# Patient Record
Sex: MALE | Race: WHITE | NOT HISPANIC OR LATINO | Employment: OTHER | URBAN - METROPOLITAN AREA
[De-identification: names, ages, dates, MRNs, and addresses within clinical notes are randomized per-mention and may not be internally consistent; named-entity substitution may affect disease eponyms.]

---

## 2023-12-13 ENCOUNTER — APPOINTMENT (EMERGENCY)
Dept: CT IMAGING | Facility: HOSPITAL | Age: 58
End: 2023-12-13
Payer: COMMERCIAL

## 2023-12-13 ENCOUNTER — HOSPITAL ENCOUNTER (EMERGENCY)
Facility: HOSPITAL | Age: 58
Discharge: HOME | End: 2023-12-13
Attending: EMERGENCY MEDICINE
Payer: COMMERCIAL

## 2023-12-13 VITALS
RESPIRATION RATE: 16 BRPM | OXYGEN SATURATION: 99 % | TEMPERATURE: 97.8 F | SYSTOLIC BLOOD PRESSURE: 124 MMHG | DIASTOLIC BLOOD PRESSURE: 85 MMHG | HEART RATE: 88 BPM

## 2023-12-13 DIAGNOSIS — M54.50 ACUTE RIGHT-SIDED LOW BACK PAIN WITHOUT SCIATICA: Primary | ICD-10-CM

## 2023-12-13 LAB
ALBUMIN SERPL BCP-MCNC: 4.7 G/DL (ref 3.5–5)
ALP SERPL-CCNC: 60 U/L (ref 34–104)
ALT SERPL W P-5'-P-CCNC: 24 U/L (ref 7–52)
ANION GAP SERPL CALCULATED.3IONS-SCNC: 8 MMOL/L
AST SERPL W P-5'-P-CCNC: 20 U/L (ref 13–39)
BACTERIA UR QL AUTO: NORMAL /HPF
BASOPHILS # BLD AUTO: 0.03 THOUSANDS/ÂΜL (ref 0–0.1)
BASOPHILS NFR BLD AUTO: 0 % (ref 0–1)
BILIRUB SERPL-MCNC: 0.44 MG/DL (ref 0.2–1)
BILIRUB UR QL STRIP: NEGATIVE
BUN SERPL-MCNC: 32 MG/DL (ref 5–25)
CALCIUM SERPL-MCNC: 9.6 MG/DL (ref 8.4–10.2)
CHLORIDE SERPL-SCNC: 106 MMOL/L (ref 96–108)
CLARITY UR: CLEAR
CO2 SERPL-SCNC: 25 MMOL/L (ref 21–32)
COLOR UR: YELLOW
CREAT SERPL-MCNC: 1.24 MG/DL (ref 0.6–1.3)
EOSINOPHIL # BLD AUTO: 0.04 THOUSAND/ÂΜL (ref 0–0.61)
EOSINOPHIL NFR BLD AUTO: 1 % (ref 0–6)
ERYTHROCYTE [DISTWIDTH] IN BLOOD BY AUTOMATED COUNT: 15 % (ref 11.6–15.1)
GFR SERPL CREATININE-BSD FRML MDRD: 62 ML/MIN/1.73SQ M
GLUCOSE SERPL-MCNC: 119 MG/DL (ref 65–140)
GLUCOSE UR STRIP-MCNC: NEGATIVE MG/DL
HCT VFR BLD AUTO: 49.6 % (ref 36.5–49.3)
HGB BLD-MCNC: 16.5 G/DL (ref 12–17)
HGB UR QL STRIP.AUTO: ABNORMAL
IMM GRANULOCYTES # BLD AUTO: 0.03 THOUSAND/UL (ref 0–0.2)
IMM GRANULOCYTES NFR BLD AUTO: 0 % (ref 0–2)
KETONES UR STRIP-MCNC: NEGATIVE MG/DL
LEUKOCYTE ESTERASE UR QL STRIP: NEGATIVE
LIPASE SERPL-CCNC: 56 U/L (ref 11–82)
LYMPHOCYTES # BLD AUTO: 0.86 THOUSANDS/ÂΜL (ref 0.6–4.47)
LYMPHOCYTES NFR BLD AUTO: 12 % (ref 14–44)
MCH RBC QN AUTO: 30.6 PG (ref 26.8–34.3)
MCHC RBC AUTO-ENTMCNC: 33.3 G/DL (ref 31.4–37.4)
MCV RBC AUTO: 92 FL (ref 82–98)
MONOCYTES # BLD AUTO: 0.77 THOUSAND/ÂΜL (ref 0.17–1.22)
MONOCYTES NFR BLD AUTO: 11 % (ref 4–12)
NEUTROPHILS # BLD AUTO: 5.62 THOUSANDS/ÂΜL (ref 1.85–7.62)
NEUTS SEG NFR BLD AUTO: 76 % (ref 43–75)
NITRITE UR QL STRIP: NEGATIVE
NON-SQ EPI CELLS URNS QL MICRO: NORMAL /HPF
NRBC BLD AUTO-RTO: 0 /100 WBCS
PH UR STRIP.AUTO: 6 [PH]
PLATELET # BLD AUTO: 161 THOUSANDS/UL (ref 149–390)
PMV BLD AUTO: 8.7 FL (ref 8.9–12.7)
POTASSIUM SERPL-SCNC: 4.8 MMOL/L (ref 3.5–5.3)
PROT SERPL-MCNC: 7.6 G/DL (ref 6.4–8.4)
PROT UR STRIP-MCNC: NEGATIVE MG/DL
RBC # BLD AUTO: 5.39 MILLION/UL (ref 3.88–5.62)
RBC #/AREA URNS AUTO: NORMAL /HPF
SODIUM SERPL-SCNC: 139 MMOL/L (ref 135–147)
SP GR UR STRIP.AUTO: >=1.03 (ref 1–1.03)
UROBILINOGEN UR QL STRIP.AUTO: 0.2 E.U./DL
WBC # BLD AUTO: 7.35 THOUSAND/UL (ref 4.31–10.16)
WBC #/AREA URNS AUTO: NORMAL /HPF

## 2023-12-13 PROCEDURE — 83690 ASSAY OF LIPASE: CPT | Performed by: EMERGENCY MEDICINE

## 2023-12-13 PROCEDURE — 96374 THER/PROPH/DIAG INJ IV PUSH: CPT

## 2023-12-13 PROCEDURE — 99284 EMERGENCY DEPT VISIT MOD MDM: CPT | Performed by: EMERGENCY MEDICINE

## 2023-12-13 PROCEDURE — 81001 URINALYSIS AUTO W/SCOPE: CPT | Performed by: EMERGENCY MEDICINE

## 2023-12-13 PROCEDURE — G1004 CDSM NDSC: HCPCS

## 2023-12-13 PROCEDURE — 81003 URINALYSIS AUTO W/O SCOPE: CPT | Performed by: EMERGENCY MEDICINE

## 2023-12-13 PROCEDURE — 99283 EMERGENCY DEPT VISIT LOW MDM: CPT

## 2023-12-13 PROCEDURE — 74177 CT ABD & PELVIS W/CONTRAST: CPT

## 2023-12-13 PROCEDURE — 85025 COMPLETE CBC W/AUTO DIFF WBC: CPT | Performed by: EMERGENCY MEDICINE

## 2023-12-13 PROCEDURE — 80053 COMPREHEN METABOLIC PANEL: CPT | Performed by: EMERGENCY MEDICINE

## 2023-12-13 PROCEDURE — 36415 COLL VENOUS BLD VENIPUNCTURE: CPT | Performed by: EMERGENCY MEDICINE

## 2023-12-13 RX ORDER — METHOCARBAMOL 500 MG/1
500 TABLET, FILM COATED ORAL 2 TIMES DAILY
Qty: 20 TABLET | Refills: 0 | Status: SHIPPED | OUTPATIENT
Start: 2023-12-13

## 2023-12-13 RX ORDER — IBUPROFEN 600 MG/1
600 TABLET ORAL EVERY 6 HOURS PRN
Qty: 30 TABLET | Refills: 0 | Status: SHIPPED | OUTPATIENT
Start: 2023-12-13

## 2023-12-13 RX ORDER — METHOCARBAMOL 500 MG/1
500 TABLET, FILM COATED ORAL ONCE
Status: COMPLETED | OUTPATIENT
Start: 2023-12-13 | End: 2023-12-13

## 2023-12-13 RX ORDER — KETOROLAC TROMETHAMINE 30 MG/ML
15 INJECTION, SOLUTION INTRAMUSCULAR; INTRAVENOUS ONCE
Status: COMPLETED | OUTPATIENT
Start: 2023-12-13 | End: 2023-12-13

## 2023-12-13 RX ADMIN — IOHEXOL 100 ML: 350 INJECTION, SOLUTION INTRAVENOUS at 19:17

## 2023-12-13 RX ADMIN — METHOCARBAMOL TABLETS 500 MG: 500 TABLET, COATED ORAL at 20:57

## 2023-12-13 RX ADMIN — KETOROLAC TROMETHAMINE 15 MG: 30 INJECTION INTRAMUSCULAR; INTRAVENOUS at 18:36

## 2023-12-13 NOTE — ED PROVIDER NOTES
"History  Chief Complaint   Patient presents with    Back Pain     R lower sharp back pain for a few days without ELIE. Took motrin PTA. [facility] and was sent for a CT scan for \"dilated intestines.\" Denies abdominal pain, N/V/D/C, fever, urinary changes.      58-year-old male presents to the emergency department for evaluation of back pain.  The patient reports that he believes he first felt the back pain a few days ago when he bent over to tie his shoe.  He denies any recent falls or direct trauma to the area.  He describes the pain as a constant dull achy sensation that is worse with movement.  He has been using over-the-counter medications including Motrin with minimal relief.  He went to urgent care prior to coming to the emergency department today.  He states that they did an x-ray and told him that his intestines were \"dilated\" and that he should come to the emergency department for a CT scan.  He denies fever, chills, nausea, vomiting, diarrhea, urinary symptoms.  He states his last bowel movement was today and was normal for him.        None       History reviewed. No pertinent past medical history.    History reviewed. No pertinent surgical history.    History reviewed. No pertinent family history.  I have reviewed and agree with the history as documented.    E-Cigarette/Vaping    E-Cigarette Use Never User      E-Cigarette/Vaping Substances     Social History     Tobacco Use    Smoking status: Never    Smokeless tobacco: Never   Vaping Use    Vaping status: Never Used   Substance Use Topics    Alcohol use: Not Currently    Drug use: Never       Review of Systems   Constitutional:  Negative for chills and fever.   HENT:  Negative for ear pain and sore throat.    Eyes:  Negative for pain and visual disturbance.   Respiratory:  Negative for cough and shortness of breath.    Cardiovascular:  Negative for chest pain and palpitations.   Gastrointestinal:  Negative for abdominal pain and " vomiting.   Genitourinary:  Negative for dysuria and hematuria.   Musculoskeletal:  Positive for back pain. Negative for arthralgias.   Skin:  Negative for color change and rash.   Neurological:  Negative for seizures and syncope.   All other systems reviewed and are negative.      Physical Exam  Physical Exam  Vitals and nursing note reviewed.   Constitutional:       General: He is not in acute distress.     Appearance: He is well-developed.   HENT:      Head: Normocephalic and atraumatic.      Right Ear: External ear normal.      Left Ear: External ear normal.      Mouth/Throat:      Mouth: Mucous membranes are moist.   Eyes:      Conjunctiva/sclera: Conjunctivae normal.   Cardiovascular:      Rate and Rhythm: Normal rate and regular rhythm.      Heart sounds: No murmur heard.  Pulmonary:      Effort: Pulmonary effort is normal. No respiratory distress.      Breath sounds: Normal breath sounds.   Abdominal:      Palpations: Abdomen is soft.      Tenderness: There is no abdominal tenderness.   Musculoskeletal:         General: No swelling.      Cervical back: Normal and neck supple.      Thoracic back: Normal.      Lumbar back: Normal. No tenderness or bony tenderness. Negative right straight leg raise test and negative left straight leg raise test.      Comments: No reproducible lumbar tenderness   Skin:     General: Skin is warm and dry.      Capillary Refill: Capillary refill takes less than 2 seconds.   Neurological:      Mental Status: He is alert.   Psychiatric:         Mood and Affect: Mood normal.         Vital Signs  ED Triage Vitals   Temperature Pulse Respirations Blood Pressure SpO2   12/13/23 1815 12/13/23 1815 12/13/23 1837 12/13/23 1815 12/13/23 1815   97.8 °F (36.6 °C) (!) 107 16 134/86 99 %      Temp Source Heart Rate Source Patient Position - Orthostatic VS BP Location FiO2 (%)   12/13/23 1815 12/13/23 1815 12/13/23 1815 12/13/23 1815 --   Oral Monitor Sitting Right arm       Pain Score        12/13/23 1836       6           Vitals:    12/13/23 1815 12/13/23 2059   BP: 134/86 124/85   Pulse: (!) 107 88   Patient Position - Orthostatic VS: Sitting Lying         Visual Acuity      ED Medications  Medications   ketorolac (TORADOL) injection 15 mg (15 mg Intravenous Given 12/13/23 1836)   iohexol (OMNIPAQUE) 350 MG/ML injection (SINGLE-DOSE) 100 mL (100 mL Intravenous Given 12/13/23 1917)   methocarbamol (ROBAXIN) tablet 500 mg (500 mg Oral Given 12/13/23 2057)       Diagnostic Studies  Results Reviewed       Procedure Component Value Units Date/Time    Urine Microscopic [[id number]]  (Normal) Collected: 12/13/23 1911    Lab Status: Final result Specimen: Urine, Clean Catch Updated: 12/13/23 1925     RBC, UA 1-2 /hpf      WBC, UA 1-2 /hpf      Epithelial Cells None Seen /hpf      Bacteria, UA None Seen /hpf     UA (URINE) with reflex to Scope [[id number]]  (Abnormal) Collected: 12/13/23 1911    Lab Status: Final result Specimen: Urine, Clean Catch Updated: 12/13/23 1919     Color, UA Yellow     Clarity, UA Clear     Specific Gravity, UA >=1.030     pH, UA 6.0     Leukocytes, UA Negative     Nitrite, UA Negative     Protein, UA Negative mg/dl      Glucose, UA Negative mg/dl      Ketones, UA Negative mg/dl      Urobilinogen, UA 0.2 E.U./dl      Bilirubin, UA Negative     Occult Blood, UA 1+    Comprehensive metabolic panel [[id number]]  (Abnormal) Collected: 12/13/23 1836    Lab Status: Final result Specimen: Blood from Arm, Left Updated: 12/13/23 1904     Sodium 139 mmol/L      Potassium 4.8 mmol/L      Chloride 106 mmol/L      CO2 25 mmol/L      ANION GAP 8 mmol/L      BUN 32 mg/dL      Creatinine 1.24 mg/dL      Glucose 119 mg/dL      Calcium 9.6 mg/dL      AST 20 U/L      ALT 24 U/L      Alkaline Phosphatase 60 U/L      Total Protein 7.6 g/dL      Albumin 4.7 g/dL      Total Bilirubin 0.44 mg/dL      eGFR 62 ml/min/1.73sq m     Narrative:      [employer] Kid[employer] guidelines for Chronic Kidney  Disease (CKD):     Stage 1 with normal or high GFR (GFR > 90 mL/min/1.73 square meters)    Stage 2 Mild CKD (GFR = 60-89 mL/min/1.73 square meters)    Stage 3A Moderate CKD (GFR = 45-59 mL/min/1.73 square meters)    Stage 3B Moderate CKD (GFR = 30-44 mL/min/1.73 square meters)    Stage 4 Severe CKD (GFR = 15-29 mL/min/1.73 square meters)    Stage 5 End Stage CKD (GFR <15 mL/min/1.73 square meters)  Note: GFR calculation is accurate only with a steady state creatinine    Lipase [[id number]]  (Normal) Collected: 12/13/23 1836    Lab Status: Final result Specimen: Blood from Arm, Left Updated: 12/13/23 1904     Lipase 56 u/L     CBC and differential [[id number]]  (Abnormal) Collected: 12/13/23 1836    Lab Status: Final result Specimen: Blood from Arm, Left Updated: 12/13/23 1846     WBC 7.35 Thousand/uL      RBC 5.39 Million/uL      Hemoglobin 16.5 g/dL      Hematocrit 49.6 %      MCV 92 fL      MCH 30.6 pg      MCHC 33.3 g/dL      RDW 15.0 %      MPV 8.7 fL      Platelets 161 Thousands/uL      nRBC 0 /100 WBCs      Neutrophils Relative 76 %      Immat GRANS % 0 %      Lymphocytes Relative 12 %      Monocytes Relative 11 %      Eosinophils Relative 1 %      Basophils Relative 0 %      Neutrophils Absolute 5.62 Thousands/µL      Immature Grans Absolute 0.03 Thousand/uL      Lymphocytes Absolute 0.86 Thousands/µL      Monocytes Absolute 0.77 Thousand/µL      Eosinophils Absolute 0.04 Thousand/µL      Basophils Absolute 0.03 Thousands/µL                    CT recon only lumbar spine   Final Result by Issa Vargas MD (12/13 2012)      1.  No fracture or traumatic subluxation.   2.  Mild overall degenerative changes as noted above.         Workstation performed: [id number]         CT abdomen pelvis with contrast   Final Result by Issa Vargas MD (12/13 2007)      1.  No acute findings in the abdomen/pelvis. No bowel obstruction.            Workstation performed: [id number]                    Procedures  Procedures    "      ED Course                   Medical Decision Making  58-year-old male presented to the emergency department for evaluation of back pain.  Prior to arrival the patient was evaluated at an urgent care and was sent to the emergency department for a CT scan because of \"dilated intestines\" that were seen on x-rays.  On arrival to the emergency department the patient was awake, alert, oriented and in no acute distress.  Initial vital signs within normal limits.  Physical exam was unremarkable.  Patient treated symptomatically with Toradol.  Based on abnormal outpatient imaging CT scan was ordered to evaluate for acute pathology including but not limited to obstruction, perforation, abscess, infection, fracture.  Blood work was grossly unremarkable.  Urinalysis was not consistent with a urinary tract infection.  CT scan with no acute findings.  All diagnostic studies were discussed with the patient in detail.  On reevaluation the patient reported some improvement of symptoms.  Patient given a dose of Robaxin.  He is appropriate for discharge at this time.  The patient was provided with a prescription for Motrin and Robaxin.  An ambulatory referral to [facility] was placed.  Recommendation was made for the patient to follow-up with his PCP and with [facility].  Return precautions were discussed.    Patient agrees with the plan for discharge and feels comfortable to go home with proper f/u. Advised to return for worsening or additional problems. Diagnostic tests were reviewed and questions answered. Diagnosis, care plan and treatment options were discussed. The patient understands instructions and will follow up as directed.        Amount and/or Complexity of Data Reviewed  Labs: ordered.  Radiology: ordered.    Risk  Prescription drug management.             Disposition  Final diagnoses:   Acute right-sided low back pain without sciatica     Time reflects when diagnosis was documented in both MDM as " applicable and the Disposition within this note       Time User Action Codes Description Comment    12/13/2023  8:53 PM Escobar Garcia Add [M54.50] Acute right-sided low back pain without sciatica           ED Disposition       ED Disposition   Discharge    Condition   Stable    Date/Time   Wed Dec 13, 2023  8:53 PM    Comment   Indio Copeland discharge to home/self care.                   Follow-up Information       Follow up With Specialties Details Why Contact Info Additional Information    [facility] Emergency Department Emergency Medicine Go to  If symptoms worsen [address] [address]  [phone] [facility] Emergency Department, [address] [address]    [facility] Physical Therapy Schedule an appointment as soon as possible for a visit   [phone]            Discharge Medication List as of 12/13/2023  8:55 PM        START taking these medications    Details   ibuprofen (MOTRIN) 600 mg tablet Take 1 tablet (600 mg total) by mouth every 6 (six) hours as needed for mild pain or moderate pain, Starting Wed 12/13/2023, Normal      methocarbamol (ROBAXIN) 500 mg tablet Take 1 tablet (500 mg total) by mouth 2 (two) times a day, Starting Wed 12/13/2023, Normal                 PDMP Review       None            ED Provider  Electronically Signed by             Escobar Garcia MD  12/13/23 6650     Rituxan Counseling:  I discussed with the patient the risks of Rituxan infusions. Side effects can include infusion reactions, severe drug rashes including mucocutaneous reactions, reactivation of latent hepatitis and other infections and rarely progressive multifocal leukoencephalopathy.  All of the patient's questions and concerns were addressed.

## 2023-12-13 NOTE — Clinical Note
Indio Copeland was seen and treated in our emergency department on 12/13/2023.    No restrictions            Diagnosis: Back pain    Indio  may return to work on return date.    He may return on this date: 12/15/2023         If you have any questions or concerns, please don't hesitate to call.      Escobar Garcia MD    ______________________________           _______________          _______________  Hospital Representative                              Date                                Time

## 2023-12-14 ENCOUNTER — TELEPHONE (OUTPATIENT)
Dept: PHYSICAL THERAPY | Facility: OTHER | Age: 58
End: 2023-12-14

## 2023-12-14 NOTE — TELEPHONE ENCOUNTER
Call placed to the patient per [facility] referral.    Phone call was answered but no one said "Hello". I asked for Earle Díaz and stated where I was calling from, and call was disconnected    This is the 1st attempt to reach the patient. Will defer per protocol.

## 2023-12-19 NOTE — TELEPHONE ENCOUNTER
Call placed to the patient per [facility] referral.    Voice message left for patient to call back. Phone number and hours of business provided.     This the 2nd attempt to reach the patient.   Will defer per protocol.

## 2024-03-08 ENCOUNTER — TELEPHONE (OUTPATIENT)
Age: 59
End: 2024-03-08

## 2025-05-15 ENCOUNTER — HOSPITAL ENCOUNTER (INPATIENT)
Facility: HOSPITAL | Age: 60
LOS: 2 days | Discharge: HOME | DRG: 394 | End: 2025-05-17
Attending: EMERGENCY MEDICINE | Admitting: STUDENT IN AN ORGANIZED HEALTH CARE EDUCATION/TRAINING PROGRAM
Payer: COMMERCIAL

## 2025-05-15 ENCOUNTER — APPOINTMENT (EMERGENCY)
Dept: CT IMAGING | Facility: HOSPITAL | Age: 60
DRG: 394 | End: 2025-05-15
Payer: COMMERCIAL

## 2025-05-15 DIAGNOSIS — R53.1 GENERALIZED WEAKNESS: ICD-10-CM

## 2025-05-15 DIAGNOSIS — R55 NEAR SYNCOPE: ICD-10-CM

## 2025-05-15 DIAGNOSIS — D64.9 ANEMIA: ICD-10-CM

## 2025-05-15 DIAGNOSIS — K92.1 MELENA: Primary | ICD-10-CM

## 2025-05-15 PROBLEM — R19.5 DARK STOOLS: Status: ACTIVE | Noted: 2025-05-15

## 2025-05-15 PROBLEM — R65.10 SIRS (SYSTEMIC INFLAMMATORY RESPONSE SYNDROME) (HCC): Status: ACTIVE | Noted: 2025-05-15

## 2025-05-15 LAB
ABO GROUP BLD: NORMAL
ABO GROUP BLD: NORMAL
ALBUMIN SERPL BCG-MCNC: 3.7 G/DL (ref 3.5–5)
ALP SERPL-CCNC: 31 U/L (ref 34–104)
ALT SERPL W P-5'-P-CCNC: 18 U/L (ref 7–52)
ANION GAP SERPL CALCULATED.3IONS-SCNC: 9 MMOL/L (ref 4–13)
APTT PPP: 21 SECONDS (ref 23–34)
AST SERPL W P-5'-P-CCNC: 17 U/L (ref 13–39)
BASOPHILS # BLD AUTO: 0.05 THOUSANDS/ÂΜL (ref 0–0.1)
BASOPHILS NFR BLD AUTO: 0 % (ref 0–1)
BILIRUB SERPL-MCNC: 0.31 MG/DL (ref 0.2–1)
BLD GP AB SCN SERPL QL: NEGATIVE
BUN SERPL-MCNC: 53 MG/DL (ref 5–25)
CALCIUM SERPL-MCNC: 8.4 MG/DL (ref 8.4–10.2)
CHLORIDE SERPL-SCNC: 106 MMOL/L (ref 96–108)
CO2 SERPL-SCNC: 22 MMOL/L (ref 21–32)
CREAT SERPL-MCNC: 1.06 MG/DL (ref 0.6–1.3)
EOSINOPHIL # BLD AUTO: 0 THOUSAND/ÂΜL (ref 0–0.61)
EOSINOPHIL NFR BLD AUTO: 0 % (ref 0–6)
ERYTHROCYTE [DISTWIDTH] IN BLOOD BY AUTOMATED COUNT: 19.4 % (ref 11.6–15.1)
GFR SERPL CREATININE-BSD FRML MDRD: 75 ML/MIN/1.73SQ M
GLUCOSE SERPL-MCNC: 207 MG/DL (ref 65–140)
HCT VFR BLD AUTO: 26.3 % (ref 36.5–49.3)
HEMOCCULT STL QL IA: POSITIVE
HGB BLD-MCNC: 8 G/DL (ref 12–17)
IMM GRANULOCYTES # BLD AUTO: 0.19 THOUSAND/UL (ref 0–0.2)
IMM GRANULOCYTES NFR BLD AUTO: 1 % (ref 0–2)
INR PPP: 1.11 (ref 0.85–1.19)
LIPASE SERPL-CCNC: 43 U/L (ref 11–82)
LYMPHOCYTES # BLD AUTO: 1.7 THOUSANDS/ÂΜL (ref 0.6–4.47)
LYMPHOCYTES NFR BLD AUTO: 12 % (ref 14–44)
MCH RBC QN AUTO: 25.9 PG (ref 26.8–34.3)
MCHC RBC AUTO-ENTMCNC: 30.4 G/DL (ref 31.4–37.4)
MCV RBC AUTO: 85 FL (ref 82–98)
MONOCYTES # BLD AUTO: 1.26 THOUSAND/ÂΜL (ref 0.17–1.22)
MONOCYTES NFR BLD AUTO: 9 % (ref 4–12)
NEUTROPHILS # BLD AUTO: 10.88 THOUSANDS/ÂΜL (ref 1.85–7.62)
NEUTS SEG NFR BLD AUTO: 78 % (ref 43–75)
NRBC BLD AUTO-RTO: 0 /100 WBCS
PLATELET # BLD AUTO: 204 THOUSANDS/UL (ref 149–390)
PMV BLD AUTO: 9.3 FL (ref 8.9–12.7)
POTASSIUM SERPL-SCNC: 3.9 MMOL/L (ref 3.5–5.3)
PROCALCITONIN SERPL-MCNC: 0.15 NG/ML
PROT SERPL-MCNC: 5.5 G/DL (ref 6.4–8.4)
PROTHROMBIN TIME: 14.7 SECONDS (ref 12.3–15)
RBC # BLD AUTO: 3.09 MILLION/UL (ref 3.88–5.62)
RH BLD: NEGATIVE
RH BLD: NEGATIVE
SODIUM SERPL-SCNC: 137 MMOL/L (ref 135–147)
SPECIMEN EXPIRATION DATE: NORMAL
WBC # BLD AUTO: 14.08 THOUSAND/UL (ref 4.31–10.16)

## 2025-05-15 PROCEDURE — 80053 COMPREHEN METABOLIC PANEL: CPT | Performed by: EMERGENCY MEDICINE

## 2025-05-15 PROCEDURE — 30233N1 TRANSFUSION OF NONAUTOLOGOUS RED BLOOD CELLS INTO PERIPHERAL VEIN, PERCUTANEOUS APPROACH: ICD-10-PCS | Performed by: STUDENT IN AN ORGANIZED HEALTH CARE EDUCATION/TRAINING PROGRAM

## 2025-05-15 PROCEDURE — 36415 COLL VENOUS BLD VENIPUNCTURE: CPT | Performed by: EMERGENCY MEDICINE

## 2025-05-15 PROCEDURE — 86923 COMPATIBILITY TEST ELECTRIC: CPT

## 2025-05-15 PROCEDURE — 85730 THROMBOPLASTIN TIME PARTIAL: CPT | Performed by: EMERGENCY MEDICINE

## 2025-05-15 PROCEDURE — 86900 BLOOD TYPING SEROLOGIC ABO: CPT | Performed by: EMERGENCY MEDICINE

## 2025-05-15 PROCEDURE — 85025 COMPLETE CBC W/AUTO DIFF WBC: CPT | Performed by: EMERGENCY MEDICINE

## 2025-05-15 PROCEDURE — 99223 1ST HOSP IP/OBS HIGH 75: CPT

## 2025-05-15 PROCEDURE — 86850 RBC ANTIBODY SCREEN: CPT | Performed by: EMERGENCY MEDICINE

## 2025-05-15 PROCEDURE — 93005 ELECTROCARDIOGRAM TRACING: CPT

## 2025-05-15 PROCEDURE — 36430 TRANSFUSION BLD/BLD COMPNT: CPT

## 2025-05-15 PROCEDURE — 85610 PROTHROMBIN TIME: CPT | Performed by: EMERGENCY MEDICINE

## 2025-05-15 PROCEDURE — 99285 EMERGENCY DEPT VISIT HI MDM: CPT

## 2025-05-15 PROCEDURE — G0328 FECAL BLOOD SCRN IMMUNOASSAY: HCPCS | Performed by: EMERGENCY MEDICINE

## 2025-05-15 PROCEDURE — 96374 THER/PROPH/DIAG INJ IV PUSH: CPT

## 2025-05-15 PROCEDURE — 83690 ASSAY OF LIPASE: CPT | Performed by: EMERGENCY MEDICINE

## 2025-05-15 PROCEDURE — 83550 IRON BINDING TEST: CPT

## 2025-05-15 PROCEDURE — P9016 RBC LEUKOCYTES REDUCED: HCPCS

## 2025-05-15 PROCEDURE — 74177 CT ABD & PELVIS W/CONTRAST: CPT

## 2025-05-15 PROCEDURE — 86901 BLOOD TYPING SEROLOGIC RH(D): CPT | Performed by: EMERGENCY MEDICINE

## 2025-05-15 PROCEDURE — 84145 PROCALCITONIN (PCT): CPT

## 2025-05-15 PROCEDURE — 82728 ASSAY OF FERRITIN: CPT

## 2025-05-15 PROCEDURE — 99285 EMERGENCY DEPT VISIT HI MDM: CPT | Performed by: EMERGENCY MEDICINE

## 2025-05-15 PROCEDURE — 83540 ASSAY OF IRON: CPT

## 2025-05-15 RX ORDER — ONDANSETRON 2 MG/ML
4 INJECTION INTRAMUSCULAR; INTRAVENOUS EVERY 6 HOURS PRN
Status: DISCONTINUED | OUTPATIENT
Start: 2025-05-15 | End: 2025-05-17 | Stop reason: HOSPADM

## 2025-05-15 RX ORDER — ACETAMINOPHEN 325 MG/1
975 TABLET ORAL EVERY 8 HOURS PRN
Status: DISCONTINUED | OUTPATIENT
Start: 2025-05-15 | End: 2025-05-17 | Stop reason: HOSPADM

## 2025-05-15 RX ORDER — PANTOPRAZOLE SODIUM 40 MG/10ML
40 INJECTION, POWDER, LYOPHILIZED, FOR SOLUTION INTRAVENOUS EVERY 12 HOURS
Status: DISCONTINUED | OUTPATIENT
Start: 2025-05-16 | End: 2025-05-17

## 2025-05-15 RX ADMIN — IOHEXOL 90 ML: 350 INJECTION, SOLUTION INTRAVENOUS at 20:28

## 2025-05-15 RX ADMIN — SODIUM CHLORIDE 80 MG: 9 INJECTION, SOLUTION INTRAVENOUS at 20:03

## 2025-05-15 RX ADMIN — SODIUM CHLORIDE 1000 ML: 0.9 INJECTION, SOLUTION INTRAVENOUS at 19:47

## 2025-05-15 NOTE — ED PROVIDER NOTES
Time reflects when diagnosis was documented in both MDM as applicable and the Disposition within this note       Time User Action Codes Description Comment    5/15/2025  9:23 PM Bonifacio Garcia [K92.1] Melena     5/15/2025  9:23 PM Bonifacio Garcia [D64.9] Anemia     5/15/2025  9:23 PM Bonifacio Garcia [R55] Near syncope     5/15/2025  9:23 PM Bonifacio Garcia [R53.1] Generalized weakness           ED Disposition       ED Disposition   Admit    Condition   Stable    Date/Time   Thu May 15, 2025  9:40 PM    Comment   Case was discussed with [facility] admitting provider and the patient's admission status was agreed to be Admission Status: inpatient status to the service of [facility].               Assessment & Plan       Medical Decision Making  60 y/o male with no significant past medical history presents to the ED for evaluation of lightheadedness, generalized weakness, and dark bowel movements.  The patient states that he has been experiencing intermittent generalized weakness and lightheadedness for the last few days, however he notes that it worsened today when he woke up.  The symptoms worsen when he stands up and improves when he sits down.  He states that he was walking up a flight of stairs at home tonight when he experienced worsening of lightheadedness and a near syncopal episode, however he did not experience full loss of consciousness.  He states that he has had dark bowel movements over the last several days but has not noticed any yari blood. He denies any history of similar symptoms in the past.  No fever, chills, cough, dyspnea, chest pain, abdominal pain, nausea, vomiting, diarrhea, urinary symptoms, back pain, neck pain, headache, numbness, or focal weakness.  He denies any regular tobacco use, alcohol intake, or illicit drug use.  He denies history of arrhythmia or GI bleed. He has family history of diverticular disease in his mother.    Vital signs reviewed.  Mildly pale conjunctiva,  slight pallor.  Abdominal exam benign, nontender, soft.  Chaperoned rectal examination performed.  No hemorrhoids.  No anal fissure.  On digital rectal exam, dark brown stool noted, no yari blood.  No palpable internal hemorrhoids or masses.  See physical exam documentation for full exam findings.  Differential diagnosis includes but is not limited to anemia, diverticulitis, arrhythmia, coagulopathy, GI bleed, peptic ulcer disease, and/or electrolyte disturbance.  Will evaluate with labs and imaging.  See ED course for independent interpretation of results.  Workup reveals anemia. Hgb 8.0. Prior Hgb a year ago was 16.  CT with no acute findings.  I discussed with the patient regarding findings and indications for admission and GI evaluation and the patient is agreeable.  Patient with symptomatic anemia and I believe the patient would benefit from transfusion of 1 unit PRBCs.  I discussed the indications, risk, and benefits of blood transfusion with the patient and the patient provides verbal and written consent.  Patient also given Protonix 80 mg.  Case discussed with hospitalist for admission.      Amount and/or Complexity of Data Reviewed  Labs: ordered. Decision-making details documented in ED Course.  Radiology: ordered. Decision-making details documented in ED Course.    Risk  Prescription drug management.  Decision regarding hospitalization.        ED Course as of 05/15/25 2208   Thu May 15, 2025   1944 Procedure Note: EKG  Date/Time: 05/15/25 7:42 PM   Interpreted by: Bonifacio Garcia MD  Indications / Diagnosis: Lightheadedness  ECG reviewed by me, the ED Physician: yes   The EKG demonstrates:  Rhythm: sinus tachycardia 110 bpm  Intervals: normal intervals  Axis: normal axis  QRS/Blocks: normal QRS  ST Changes: No STEMI.  No acute ST-T wave changes.  Otherwise normal ECG.   1955 CBC and differential(!)  WBC 14.08.  Hgb 8, prior Hgb was 16.5 on blood work a year ago.  Decreased Hct 26.3. Normal platelet count  204.   2016 iFOBT (FIT)(!)  Occult blood positive   2016 PTT(!): 21 2016 LIPASE: 43  WNL   2016 PROTIME: 14.7  WNL   2016 POCT INR: 1.11  WNL   2016 Comprehensive metabolic panel(!)  Increased BUN 53 with normal creatinine 1.06, overall suspicious for gastrointestinal bleed.  Slight increase in glucose 207.  Alk phos 31 and total protein 5.5.  Remainder of chemistry values are all WNL.   2115 CT abdomen pelvis with contrast  No acute inflammatory process identified in the abdomen or pelvis.     Stable nonobstructing left-sided intrarenal calculi measuring up to 8 x 5 mm without hydronephrosis.       Medications   sodium chloride 0.9 % bolus 1,000 mL (0 mL Intravenous Stopped 5/15/25 2047)   pantoprazole (PROTONIX) 80 mg in sodium chloride 0.9 % 100 mL IVPB (0 mg Intravenous Stopped 5/15/25 2018)   iohexol (OMNIPAQUE) 350 MG/ML injection (SINGLE-DOSE) 90 mL (90 mL Intravenous Given 5/15/25 2028)       ED Risk Strat Scores                    No data recorded        SBIRT 20yo+      Flowsheet Row Most Recent Value   Initial Alcohol Screen: US AUDIT-C     1. How often do you have a drink containing alcohol? 0 Filed at: 05/15/2025 1943   2. How many drinks containing alcohol do you have on a typical day you are drinking?  0 Filed at: 05/15/2025 1943   3a. Male UNDER 65: How often do you have five or more drinks on one occasion? 0 Filed at: 05/15/2025 1943   3b. FEMALE Any Age, or MALE 65+: How often do you have 4 or more drinks on one occassion? 0 Filed at: 05/15/2025 1943   Audit-C Score 0 Filed at: 05/15/2025 1943   PATTIE: How many times in the past year have you...    Used an illegal drug or used a prescription medication for non-medical reasons? Never Filed at: 05/15/2025 1943                            History of Present Illness       Chief Complaint   Patient presents with    Weakness - Generalized     Reports generalized weakness, dizziness, abd pain, N/V, black stools x2 days. -thinners.        History reviewed.  No pertinent past medical history.   History reviewed. No pertinent surgical history.   History reviewed. No pertinent family history.   Social History[1]   E-Cigarette/Vaping    E-Cigarette Use Never User       E-Cigarette/Vaping Substances      I have reviewed and agree with the history as documented.     58 y/o male with no significant past medical history presents to the ED for evaluation of lightheadedness, generalized weakness, and dark bowel movements.  The patient states that he has been experiencing intermittent generalized weakness and lightheadedness for the last few days, however he notes that it worsened today when he woke up.  The symptoms worsen when he stands up and improves when he sits down.  He states that he was walking up a flight of stairs at home tonight when he experienced worsening of lightheadedness and a near syncopal episode, however he did not experience full loss of consciousness.  He states that he has had dark bowel movements over the last several days but has not noticed any yari blood. He denies any history of similar symptoms in the past.  No fever, chills, cough, dyspnea, chest pain, abdominal pain, nausea, vomiting, diarrhea, urinary symptoms, back pain, neck pain, headache, numbness, or focal weakness.  He denies any regular tobacco use, alcohol intake, or illicit drug use.  He denies history of arrhythmia or GI bleed. He has family history of diverticular disease in his mother.        Review of Systems   Constitutional:  Positive for fatigue. Negative for chills and fever.   HENT:  Negative for congestion, rhinorrhea and sore throat.    Respiratory:  Negative for cough and shortness of breath.    Cardiovascular:  Negative for chest pain and palpitations.   Gastrointestinal:  Positive for abdominal pain. Negative for diarrhea, nausea and vomiting.        Dark stools, generalized abdominal pain, see HPI   Genitourinary:  Negative for dysuria and hematuria.   Musculoskeletal:   Negative for back pain and neck pain.   Neurological:  Positive for light-headedness. Negative for weakness, numbness and headaches.   All other systems reviewed and are negative.          Objective       ED Triage Vitals [05/15/25 1938]   Temperature Pulse Blood Pressure Respirations SpO2 Patient Position - Orthostatic VS   97.9 °F (36.6 °C) (!) 109 125/70 18 100 % Lying      Temp Source Heart Rate Source BP Location FiO2 (%) Pain Score    Oral Monitor Left arm -- --      Vitals      Date and Time Temp Pulse SpO2 Resp BP Pain Score FACES Pain Rating User   05/15/25 2155 98.8 °F (37.1 °C) -- -- 16 -- -- --    05/15/25 2125 99 °F (37.2 °C) -- -- -- 119/71 -- --    05/15/25 2120 99 °F (37.2 °C) 99 99 % 16 114/66 -- --    05/15/25 2115 99 °F (37.2 °C) -- -- 14 110/65 -- --    05/15/25 2058 98.7 °F (37.1 °C) 104 -- 16 118/71 -- --    05/15/25 2030 -- 104 100 % 14 130/79 -- --    05/15/25 2015 -- 100 99 % 16 124/67 -- --    05/15/25 2000 -- 99 99 % 14 122/73 -- --    05/15/25 1938 97.9 °F (36.6 °C) 109 100 % 18 125/70 -- -- DS            Physical Exam  Vitals and nursing note reviewed.   Constitutional:       General: He is not in acute distress.     Appearance: Normal appearance. He is normal weight.   HENT:      Head: Normocephalic and atraumatic.      Right Ear: External ear normal.      Left Ear: External ear normal.      Nose: Nose normal.      Mouth/Throat:      Mouth: Mucous membranes are moist.      Pharynx: Oropharynx is clear. No oropharyngeal exudate or posterior oropharyngeal erythema.     Eyes:      Extraocular Movements: Extraocular movements intact.      Pupils: Pupils are equal, round, and reactive to light.      Comments: Mildly pale conjunctiva.     Cardiovascular:      Rate and Rhythm: Normal rate and regular rhythm.      Pulses: Normal pulses.      Heart sounds: Normal heart sounds.   Pulmonary:      Effort: Pulmonary effort is normal. No respiratory distress.      Breath sounds:  Normal breath sounds. No wheezing or rales.   Abdominal:      General: Abdomen is flat. Bowel sounds are normal. There is no distension.      Palpations: Abdomen is soft.      Tenderness: There is no abdominal tenderness. There is no right CVA tenderness, left CVA tenderness or guarding.   Genitourinary:     Comments: Chaperoned rectal examination performed.  No hemorrhoids.  No anal fissure.  On digital rectal exam, dark brown stool noted, no yari blood.  No palpable internal hemorrhoids or masses.    Musculoskeletal:         General: No swelling or tenderness. Normal range of motion.      Cervical back: Normal range of motion and neck supple. No tenderness.     Skin:     General: Skin is warm and dry.      Coloration: Skin is pale.     Neurological:      General: No focal deficit present.      Mental Status: He is alert and oriented to person, place, and time.      Sensory: No sensory deficit.      Motor: No weakness.         Results Reviewed       Procedure Component Value Units Date/Time    iFOBT (FIT) [[id number]]  (Abnormal) Collected: 05/15/25 1951    Lab Status: Final result Specimen: Stool from Per Rectum Updated: 05/15/25 2010     OCCULT BLD, FECAL IMMUNOLOGICAL Positive    Narrative:        Performed by Fecal Immunochemical Test.    Protime-INR [[id number]]  (Normal) Collected: 05/15/25 1946    Lab Status: Final result Specimen: Blood from Arm, Left Updated: 05/15/25 2009     Protime 14.7 seconds      INR 1.11    Narrative:      INR Therapeutic Range    Indication                                             INR Range      Atrial Fibrillation                                               2.0-3.0  Hypercoagulable State                                    2.0.2.3  Left Ventricular Asist Device                            2.0-3.0  Mechanical Heart Valve                                  -    Aortic(with afib, MI, embolism, HF, LA enlargement,    and/or coagulopathy)                                     2.0-3.0  (2.5-3.5)     Mitral                                                             2.5-3.5  Prosthetic/Bioprosthetic Heart Valve               2.0-3.0  Venous thromboembolism (VTE: VT, PE        2.0-3.0    APTT [[id number]]  (Abnormal) Collected: 05/15/25 1946    Lab Status: Final result Specimen: Blood from Arm, Left Updated: 05/15/25 2009     PTT 21 seconds     Comprehensive metabolic panel [[id number]]  (Abnormal) Collected: 05/15/25 1946    Lab Status: Final result Specimen: Blood from Arm, Left Updated: 05/15/25 2009     Sodium 137 mmol/L      Potassium 3.9 mmol/L      Chloride 106 mmol/L      CO2 22 mmol/L      ANION GAP 9 mmol/L      BUN 53 mg/dL      Creatinine 1.06 mg/dL      Glucose 207 mg/dL      Calcium 8.4 mg/dL      AST 17 U/L      ALT 18 U/L      Alkaline Phosphatase 31 U/L      Total Protein 5.5 g/dL      Albumin 3.7 g/dL      Total Bilirubin 0.31 mg/dL      eGFR 75 ml/min/1.73sq m     Narrative:      [employer] guidelines for Chronic Kidney Disease (CKD):     Stage 1 with normal or high GFR (GFR > 90 mL/min/1.73 square meters)    Stage 2 Mild CKD (GFR = 60-89 mL/min/1.73 square meters)    Stage 3A Moderate CKD (GFR = 45-59 mL/min/1.73 square meters)    Stage 3B Moderate CKD (GFR = 30-44 mL/min/1.73 square meters)    Stage 4 Severe CKD (GFR = 15-29 mL/min/1.73 square meters)    Stage 5 End Stage CKD (GFR <15 mL/min/1.73 square meters)  Note: GFR calculation is accurate only with a steady state creatinine    Lipase [[id number]]  (Normal) Collected: 05/15/25 1946    Lab Status: Final result Specimen: Blood from Arm, Left Updated: 05/15/25 2009     Lipase 43 u/L     CBC and differential [[id number]]  (Abnormal) Collected: 05/15/25 1946    Lab Status: Final result Specimen: Blood from Arm, Left Updated: 05/15/25 1952     WBC 14.08 Thousand/uL      RBC 3.09 Million/uL      Hemoglobin 8.0 g/dL      Hematocrit 26.3 %      MCV 85 fL      MCH 25.9 pg      MCHC 30.4 g/dL      RDW 19.4 %       MPV 9.3 fL      Platelets 204 Thousands/uL      nRBC 0 /100 WBCs      Segmented % 78 %      Immature Grans % 1 %      Lymphocytes % 12 %      Monocytes % 9 %      Eosinophils Relative 0 %      Basophils Relative 0 %      Absolute Neutrophils 10.88 Thousands/µL      Absolute Immature Grans 0.19 Thousand/uL      Absolute Lymphocytes 1.70 Thousands/µL      Absolute Monocytes 1.26 Thousand/µL      Eosinophils Absolute 0.00 Thousand/µL      Basophils Absolute 0.05 Thousands/µL             CT abdomen pelvis with contrast   Final Interpretation by Indio Hernandez MD (05/15 2059)      No acute inflammatory process identified in the abdomen or pelvis.      Stable nonobstructing left-sided intrarenal calculi measuring up to 8 x 5 mm without hydronephrosis.         Workstation performed: [id number]             Procedures    ED Medication and Procedure Management   Prior to Admission Medications   Prescriptions Last Dose Informant Patient Reported? Taking?   ibuprofen (MOTRIN) 600 mg tablet   No No   Sig: Take 1 tablet (600 mg total) by mouth every 6 (six) hours as needed for mild pain or moderate pain   methocarbamol (ROBAXIN) 500 mg tablet   No No   Sig: Take 1 tablet (500 mg total) by mouth 2 (two) times a day      Facility-Administered Medications: None     Patient's Medications   Discharge Prescriptions    No medications on file     No discharge procedures on file.  ED SEPSIS DOCUMENTATION   Time reflects when diagnosis was documented in both MDM as applicable and the Disposition within this note       Time User Action Codes Description Comment    5/15/2025  9:23 PM Bonifacio Garcia [K92.1] Melena     5/15/2025  9:23 PM Bonifacio Garcia [D64.9] Anemia     5/15/2025  9:23 PM Bonifacio Garcia [R55] Near syncope     5/15/2025  9:23 PM Bonifacio Garcia [R53.1] Generalized weakness                      [1]   Social History  Tobacco Use    Smoking status: Never    Smokeless tobacco: Never   Vaping Use    Vaping status:  Never Used   Substance Use Topics    Alcohol use: Not Currently    Drug use: Never        Bonifacio Garcia MD  05/19/25 2023

## 2025-05-16 ENCOUNTER — APPOINTMENT (INPATIENT)
Dept: GASTROENTEROLOGY | Facility: HOSPITAL | Age: 60
DRG: 394 | End: 2025-05-16
Attending: NURSE PRACTITIONER
Payer: COMMERCIAL

## 2025-05-16 ENCOUNTER — ANESTHESIA (INPATIENT)
Dept: GASTROENTEROLOGY | Facility: HOSPITAL | Age: 60
DRG: 394 | End: 2025-05-16
Payer: COMMERCIAL

## 2025-05-16 ENCOUNTER — ANESTHESIA EVENT (INPATIENT)
Dept: GASTROENTEROLOGY | Facility: HOSPITAL | Age: 60
DRG: 394 | End: 2025-05-16
Payer: COMMERCIAL

## 2025-05-16 LAB
ABO GROUP BLD BPU: NORMAL
ALBUMIN SERPL BCG-MCNC: 3.4 G/DL (ref 3.5–5)
ALP SERPL-CCNC: 30 U/L (ref 34–104)
ALT SERPL W P-5'-P-CCNC: 17 U/L (ref 7–52)
ANION GAP SERPL CALCULATED.3IONS-SCNC: 7 MMOL/L (ref 4–13)
AST SERPL W P-5'-P-CCNC: 16 U/L (ref 13–39)
ATRIAL RATE: 110 BPM
BILIRUB SERPL-MCNC: 0.55 MG/DL (ref 0.2–1)
BPU ID: NORMAL
BUN SERPL-MCNC: 33 MG/DL (ref 5–25)
CALCIUM ALBUM COR SERPL-MCNC: 8.5 MG/DL (ref 8.3–10.1)
CALCIUM SERPL-MCNC: 8 MG/DL (ref 8.4–10.2)
CHLORIDE SERPL-SCNC: 110 MMOL/L (ref 96–108)
CO2 SERPL-SCNC: 22 MMOL/L (ref 21–32)
CREAT SERPL-MCNC: 0.94 MG/DL (ref 0.6–1.3)
CROSSMATCH: NORMAL
FERRITIN SERPL-MCNC: 9 NG/ML (ref 30–336)
GFR SERPL CREATININE-BSD FRML MDRD: 87 ML/MIN/1.73SQ M
GLUCOSE SERPL-MCNC: 117 MG/DL (ref 65–140)
HGB BLD-MCNC: 6.9 G/DL (ref 12–17)
HGB BLD-MCNC: 7.4 G/DL (ref 12–17)
HGB BLD-MCNC: 7.8 G/DL (ref 12–17)
IRON SATN MFR SERPL: 33 % (ref 15–50)
IRON SERPL-MCNC: 128 UG/DL (ref 50–212)
P AXIS: 50 DEGREES
POTASSIUM SERPL-SCNC: 3.9 MMOL/L (ref 3.5–5.3)
PR INTERVAL: 146 MS
PROCALCITONIN SERPL-MCNC: 0.16 NG/ML
PROT SERPL-MCNC: 4.9 G/DL (ref 6.4–8.4)
QRS AXIS: 56 DEGREES
QRSD INTERVAL: 72 MS
QT INTERVAL: 316 MS
QTC INTERVAL: 427 MS
SODIUM SERPL-SCNC: 139 MMOL/L (ref 135–147)
T WAVE AXIS: 53 DEGREES
TIBC SERPL-MCNC: 393.4 UG/DL (ref 250–450)
TRANSFERRIN SERPL-MCNC: 281 MG/DL (ref 203–362)
UIBC SERPL-MCNC: 265 UG/DL (ref 155–355)
UNIT DISPENSE STATUS: NORMAL
UNIT PRODUCT CODE: NORMAL
UNIT PRODUCT VOLUME: 350 ML
UNIT RH: NORMAL
VENTRICULAR RATE: 110 BPM

## 2025-05-16 PROCEDURE — 0W3P8ZZ CONTROL BLEEDING IN GASTROINTESTINAL TRACT, VIA NATURAL OR ARTIFICIAL OPENING ENDOSCOPIC: ICD-10-PCS | Performed by: STUDENT IN AN ORGANIZED HEALTH CARE EDUCATION/TRAINING PROGRAM

## 2025-05-16 PROCEDURE — 99232 SBSQ HOSP IP/OBS MODERATE 35: CPT | Performed by: STUDENT IN AN ORGANIZED HEALTH CARE EDUCATION/TRAINING PROGRAM

## 2025-05-16 PROCEDURE — 93010 ELECTROCARDIOGRAM REPORT: CPT | Performed by: INTERNAL MEDICINE

## 2025-05-16 PROCEDURE — 43239 EGD BIOPSY SINGLE/MULTIPLE: CPT | Performed by: INTERNAL MEDICINE

## 2025-05-16 PROCEDURE — P9016 RBC LEUKOCYTES REDUCED: HCPCS

## 2025-05-16 PROCEDURE — 85018 HEMOGLOBIN: CPT

## 2025-05-16 PROCEDURE — 0DB98ZX EXCISION OF DUODENUM, VIA NATURAL OR ARTIFICIAL OPENING ENDOSCOPIC, DIAGNOSTIC: ICD-10-PCS | Performed by: STUDENT IN AN ORGANIZED HEALTH CARE EDUCATION/TRAINING PROGRAM

## 2025-05-16 PROCEDURE — 84145 PROCALCITONIN (PCT): CPT

## 2025-05-16 PROCEDURE — 88305 TISSUE EXAM BY PATHOLOGIST: CPT | Performed by: SPECIALIST

## 2025-05-16 PROCEDURE — 80053 COMPREHEN METABOLIC PANEL: CPT

## 2025-05-16 PROCEDURE — 99222 1ST HOSP IP/OBS MODERATE 55: CPT | Performed by: INTERNAL MEDICINE

## 2025-05-16 RX ORDER — SIMETHICONE 40MG/0.6ML
SUSPENSION, DROPS(FINAL DOSAGE FORM)(ML) ORAL AS NEEDED
Status: DISCONTINUED | OUTPATIENT
Start: 2025-05-16 | End: 2025-05-16 | Stop reason: HOSPADM

## 2025-05-16 RX ORDER — SODIUM CHLORIDE, SODIUM LACTATE, POTASSIUM CHLORIDE, CALCIUM CHLORIDE 600; 310; 30; 20 MG/100ML; MG/100ML; MG/100ML; MG/100ML
125 INJECTION, SOLUTION INTRAVENOUS CONTINUOUS
Status: DISCONTINUED | OUTPATIENT
Start: 2025-05-16 | End: 2025-05-17 | Stop reason: HOSPADM

## 2025-05-16 RX ORDER — PROPOFOL 10 MG/ML
INJECTION, EMULSION INTRAVENOUS AS NEEDED
Status: DISCONTINUED | OUTPATIENT
Start: 2025-05-16 | End: 2025-05-16

## 2025-05-16 RX ORDER — LORAZEPAM 0.5 MG/1
0.5 TABLET ORAL
Status: DISCONTINUED | OUTPATIENT
Start: 2025-05-16 | End: 2025-05-17 | Stop reason: HOSPADM

## 2025-05-16 RX ORDER — SODIUM CHLORIDE, SODIUM LACTATE, POTASSIUM CHLORIDE, CALCIUM CHLORIDE 600; 310; 30; 20 MG/100ML; MG/100ML; MG/100ML; MG/100ML
INJECTION, SOLUTION INTRAVENOUS CONTINUOUS PRN
Status: DISCONTINUED | OUTPATIENT
Start: 2025-05-16 | End: 2025-05-16

## 2025-05-16 RX ORDER — SODIUM CHLORIDE 9 MG/ML
75 INJECTION, SOLUTION INTRAVENOUS CONTINUOUS
Status: DISCONTINUED | OUTPATIENT
Start: 2025-05-16 | End: 2025-05-16

## 2025-05-16 RX ORDER — LIDOCAINE HYDROCHLORIDE 20 MG/ML
INJECTION, SOLUTION EPIDURAL; INFILTRATION; INTRACAUDAL; PERINEURAL AS NEEDED
Status: DISCONTINUED | OUTPATIENT
Start: 2025-05-16 | End: 2025-05-16

## 2025-05-16 RX ADMIN — SODIUM CHLORIDE, SODIUM LACTATE, POTASSIUM CHLORIDE, AND CALCIUM CHLORIDE: .6; .31; .03; .02 INJECTION, SOLUTION INTRAVENOUS at 15:02

## 2025-05-16 RX ADMIN — LIDOCAINE HYDROCHLORIDE 100 MG: 20 INJECTION, SOLUTION EPIDURAL; INFILTRATION; INTRACAUDAL; PERINEURAL at 15:16

## 2025-05-16 RX ADMIN — PROPOFOL 40 MG: 10 INJECTION, EMULSION INTRAVENOUS at 15:26

## 2025-05-16 RX ADMIN — SIMETHICONE 40 MG: 20 SUSPENSION/ DROPS ORAL at 15:21

## 2025-05-16 RX ADMIN — PANTOPRAZOLE SODIUM 40 MG: 40 INJECTION, POWDER, FOR SOLUTION INTRAVENOUS at 20:15

## 2025-05-16 RX ADMIN — PROPOFOL 40 MG: 10 INJECTION, EMULSION INTRAVENOUS at 15:22

## 2025-05-16 RX ADMIN — PROPOFOL 40 MG: 10 INJECTION, EMULSION INTRAVENOUS at 15:18

## 2025-05-16 RX ADMIN — SODIUM CHLORIDE 75 ML/HR: 0.9 INJECTION, SOLUTION INTRAVENOUS at 09:07

## 2025-05-16 RX ADMIN — PROPOFOL 40 MG: 10 INJECTION, EMULSION INTRAVENOUS at 15:19

## 2025-05-16 RX ADMIN — PROPOFOL 120 MG: 10 INJECTION, EMULSION INTRAVENOUS at 15:16

## 2025-05-16 RX ADMIN — PROPOFOL 40 MG: 10 INJECTION, EMULSION INTRAVENOUS at 15:20

## 2025-05-16 RX ADMIN — PANTOPRAZOLE SODIUM 40 MG: 40 INJECTION, POWDER, FOR SOLUTION INTRAVENOUS at 09:24

## 2025-05-16 RX ADMIN — PROPOFOL 40 MG: 10 INJECTION, EMULSION INTRAVENOUS at 15:24

## 2025-05-16 NOTE — H&P
H&P - Hospitalist   Name: Indio Copeland 59 y.o. male I MRN: [id number]  Unit/Bed#: -01 I Date of Admission: 5/15/2025   Date of Service: 5/15/2025 I Hospital Day: 0     Assessment & Plan  Dark stools  Patient reporting dark black stools over the past 2 days and on admission was found to have hemoglobin of 8.0.  Per chart review about 1 year ago most recent hemoglobin appears to be 16.5.  He reports associated lightheadedness and dizziness with near syncopal episode earlier today.  He denies any associated NSAID use, alcohol use, or tobacco use.  Denies any history of GI bleeding.   Had prior colonoscopy 8 years ago with [facility]  with Dr. Reid.  He reports he had 2 colon polyps biopsied which were negative.  And follows for routine screenings.  No records of this in epic  CT abdomen pelvis without acute intra-abdominal pathology  BUN 53; FOBT +  In ED with started on IV Protonix  Patient to receive 1 unit of PRBCs  Will make patient n.p.o. at midnight  Concern for upper GI bleed  Continue IV Protonix 40 mg twice daily  GI evaluation in the morning  Hold NSAIDs/AC at this time  Transfuse for hgb < 7  Anemia  Hemoglobin noted to be 8.0 down from 16.5 1 year ago.  He is also reporting melanotic stools  Suspect in the setting of acute GI bleed vs KRUNAL  Obtain iron panel  Trend CBC and transfuse for hemoglobin<7  GI consult  SIRS (systemic inflammatory response syndrome) (HCC)  Patient meeting SIRS criteria as evidenced by tachycardia and leukocytosis  He denies any infectious symptoms  CT abdomen pelvis without acute intra-abdominal pathology  Suspect noninfectious etiology in the setting of dehydration/GI bleed/anemia  Hold off on antibiotics  Trend CBC monitor fever curve  Near syncope  Patient reports near syncopal episode after walking up a flight of stairs became lightheaded felt like he was going to pass out  He denies any prodromal symptoms such as chest pain, palpitations, shortness of breath.   Symptoms subsequently resolved after sitting down  Suspect in the setting of acute anemia  Will check orthostatic vital signs  Monitor on telemetry  Continue IV fluids  Consider pt/ot consutl      VTE Pharmacologic Prophylaxis:   Moderate Risk (Score 3-4) - Pharmacological DVT Prophylaxis Contraindicated. Sequential Compression Devices Ordered.  Code Status: Level 1 - Full Code   Discussion with family: Patient declined call to .     Anticipated Length of Stay: Patient will be admitted on an inpatient basis with an anticipated length of stay of greater than 2 midnights secondary to symptomatic anemia, melena/GI bleed requiring GI consultation, hemodynamic monitoring, near syncope, SIRS.    History of Present Illness   Chief Complaint: Melena and dizziness    Indio Copeland is a 59 y.o. male with no significant PMH who presents with anemia and dark stools.  He reports that this morning he felt very tired and lightheaded and throughout the day symptoms got progressively worse.  He notes that when he was trying to get up the steps he felt as if he was going to pass out due to lightheadedness.  He reports that his symptoms resolved after resting.  He has been eating and drinking okay however today has only been able to tolerate liquids.  He reports over the past 2 days he has noticed he is had darker colored soft stools.  He denies any yari blood or bleeding.  He denies any history of GI bleeding in the past.  He denies any illicit drug use, tobacco use, frequent NSAID use, or frequent alcohol use.  He reports that his last bowel movement was yesterday.  With this he has had some associated dull generalized abdominal pain but denies any palliating or provoking factors.  He reports that he had a colonoscopy 8 years ago with [facility] where he had 2 polyps removed and just follows with routine checkups.  Since coming in patient reports that he is feeling much better.  Lightheadedness has since  resolved was able to ambulate from stretcher to bed without any difficulty.  He denies any chest pain, shortness of breath, nausea, vomiting, fevers, leg swelling, or palpitations.    Review of Systems   Constitutional:  Positive for activity change and fatigue.   Gastrointestinal:  Positive for blood in stool.   Neurological:  Positive for dizziness and light-headedness.       Historical Information   History reviewed. No pertinent past medical history.  History reviewed. No pertinent surgical history.  Social History     Tobacco Use    Smoking status: Never    Smokeless tobacco: Never   Vaping Use    Vaping status: Never Used   Substance and Sexual Activity    Alcohol use: Not Currently    Drug use: Never    Sexual activity: Not on file     E-Cigarette/Vaping    E-Cigarette Use Never User      E-Cigarette/Vaping Substances     History reviewed. No pertinent family history.  Social History:  Marital Status: Single   Occupation: N/A  Patient Pre-hospital Living Situation: Home  Patient Pre-hospital Level of Mobility: walks  Patient Pre-hospital Diet Restrictions: None    Meds/Allergies   I have reviewed home medications with patient personally.  Prior to Admission medications    Medication Sig Start Date End Date Taking? Authorizing Provider   ibuprofen (MOTRIN) 600 mg tablet Take 1 tablet (600 mg total) by mouth every 6 (six) hours as needed for mild pain or moderate pain 12/13/23   Escobar Garcia MD   methocarbamol (ROBAXIN) 500 mg tablet Take 1 tablet (500 mg total) by mouth 2 (two) times a day 12/13/23   Escobar Garcia MD     No Known Allergies    Objective :  Temp:  [97.9 °F (36.6 °C)-99 °F (37.2 °C)] 98.8 °F (37.1 °C)  HR:  [] 95  BP: (110-130)/(65-79) 118/74  Resp:  [14-18] 16  SpO2:  [99 %-100 %] 99 %  O2 Device: None (Room air)    Physical Exam  Vitals and nursing note reviewed.   Constitutional:       General: He is not in acute distress.     Appearance: He is well-developed.   HENT:       "Head: Normocephalic and atraumatic.     Eyes:      Conjunctiva/sclera: Conjunctivae normal.       Cardiovascular:      Rate and Rhythm: Normal rate and regular rhythm.      Heart sounds: No murmur heard.  Pulmonary:      Effort: Pulmonary effort is normal. No respiratory distress.      Breath sounds: Normal breath sounds.   Abdominal:      General: There is distension (soft).      Palpations: Abdomen is soft.      Tenderness: There is no abdominal tenderness. There is no guarding or rebound.     Musculoskeletal:         General: No swelling.      Cervical back: Neck supple.      Right lower leg: No edema.      Left lower leg: No edema.     Skin:     General: Skin is warm and dry.      Capillary Refill: Capillary refill takes less than 2 seconds.     Neurological:      Mental Status: He is alert.     Psychiatric:         Mood and Affect: Mood normal.          Lines/Drains:            Lab Results: I have reviewed the following results:  Results from last 7 days   Lab Units 05/15/25  1946   WBC Thousand/uL 14.08*   HEMOGLOBIN g/dL 8.0*   HEMATOCRIT % 26.3*   PLATELETS Thousands/uL 204   SEGS PCT % 78*   LYMPHO PCT % 12*   MONO PCT % 9   EOS PCT % 0     Results from last 7 days   Lab Units 05/15/25  1946   SODIUM mmol/L 137   POTASSIUM mmol/L 3.9   CHLORIDE mmol/L 106   CO2 mmol/L 22   BUN mg/dL 53*   CREATININE mg/dL 1.06   ANION GAP mmol/L 9   CALCIUM mg/dL 8.4   ALBUMIN g/dL 3.7   TOTAL BILIRUBIN mg/dL 0.31   ALK PHOS U/L 31*   ALT U/L 18   AST U/L 17   GLUCOSE RANDOM mg/dL 207*     Results from last 7 days   Lab Units 05/15/25  1946   INR  1.11         No results found for: \"HGBA1C\"        Imaging Results Review: I reviewed radiology reports from this admission including: CT abdomen/pelvis.  Other Study Results Review: EKG was reviewed.     Administrative Statements   I have spent a total time of 75 minutes in caring for this patient on the day of the visit/encounter including Diagnostic results, Prognosis, Risks " and benefits of tx options, Instructions for management, Patient and family education, Importance of tx compliance, Risk factor reductions, Impressions, Counseling / Coordination of care, Documenting in the medical record, Reviewing/placing orders in the medical record (including tests, medications, and/or procedures), Obtaining or reviewing history  , and Communicating with other healthcare professionals .    ** Please Note: This note has been constructed using a voice recognition system. **

## 2025-05-16 NOTE — ASSESSMENT & PLAN NOTE
Pt with melena x3 day prior to admission noted with drop in Hgb and elevated BUN concerning for UGI bleed from PUD, AVM, or lesion. He had a dull stomachache when symptoms started but this resolved after taking PeptoBismol and he has not had any further BM since Wednesday. Denies any NSAID use or frequent alcohol use & not on any anticoagulation. CTAP with no acute findings.  Remains HDS, Hgb 7.8 this AM stable from 8 on admission, BUN trending down.     -Keep NPO  -Maintain large bore IV access  -Monitor serial H&H, transfuse blood products as needed  -Continue supportive care with IV hydration  -Continue IV PPI BID  -Will schedule EGD this afternoon for further evaluation.  -If no etiology of anemia/melena found on EGD, may need colonoscopy   -Follow up iron studies, iron repletion per primary team

## 2025-05-16 NOTE — PLAN OF CARE
Problem: METABOLIC, FLUID AND ELECTROLYTES - ADULT  Goal: Electrolytes maintained within normal limits  Description: INTERVENTIONS:  - Monitor labs and assess patient for signs and symptoms of electrolyte imbalances  - Administer electrolyte replacement as ordered  - Monitor response to electrolyte replacements, including repeat lab results as appropriate  - Instruct patient on fluid and nutrition as appropriate  5/16/2025 0206 by Daisha Matson RN  Outcome: Progressing  5/16/2025 0205 by Daisha Matson RN  Outcome: Progressing  5/16/2025 0132 by Daisha Matson RN  Outcome: Progressing  Goal: Fluid balance maintained  Description: INTERVENTIONS:  - Monitor labs   - Monitor I/O and WT  - Instruct patient on fluid and nutrition as appropriate  - Assess for signs & symptoms of volume excess or deficit  5/16/2025 0206 by Daisha Matson RN  Outcome: Progressing  5/16/2025 0205 by Daisha Matson RN  Outcome: Progressing  5/16/2025 0132 by Daisha Matson RN  Outcome: Progressing  Goal: Glucose maintained within target range  Description: INTERVENTIONS:  - Monitor Blood Glucose as ordered  - Assess for signs and symptoms of hyperglycemia and hypoglycemia  - Administer ordered medications to maintain glucose within target range  - Assess nutritional intake and initiate nutrition service referral as needed  5/16/2025 0206 by Daisha Matson RN  Outcome: Progressing  5/16/2025 0205 by Daisha Matson RN  Outcome: Progressing  5/16/2025 0132 by Daisha Matson RN  Outcome: Progressing         Problem: GASTROINTESTINAL - ADULT  Goal: Minimal or absence of nausea and/or vomiting  Description: INTERVENTIONS:  - Administer IV fluids if ordered to ensure adequate hydration  - Maintain NPO status until nausea and vomiting are resolved  - Nasogastric tube if ordered  - Administer ordered antiemetic medications as needed  - Provide nonpharmacologic comfort measures as appropriate  - Advance diet as tolerated, if ordered  - Consider  nutrition services referral to assist patient with adequate nutrition and appropriate food choices  Outcome: Progressing  Goal: Maintains or returns to baseline bowel function  Description: INTERVENTIONS:  - Assess bowel function  - Encourage oral fluids to ensure adequate hydration  - Administer IV fluids if ordered to ensure adequate hydration  - Administer ordered medications as needed  - Encourage mobilization and activity  - Consider nutritional services referral to assist patient with adequate nutrition and appropriate food choices  Outcome: Progressing  Goal: Maintains adequate nutritional intake  Description: INTERVENTIONS:  - Monitor percentage of each meal consumed  - Identify factors contributing to decreased intake, treat as appropriate  - Assist with meals as needed  - Monitor I&O, weight, and lab values if indicated  - Obtain nutrition services referral as needed  Outcome: Progressing  Goal: Establish and maintain optimal ostomy function  Description: INTERVENTIONS:  - Assess bowel function  - Encourage oral fluids to ensure adequate hydration  - Administer IV fluids if ordered to ensure adequate hydration   - Administer ordered medications as needed  - Encourage mobilization and activity  - Nutrition services referral to assist patient with appropriate food choices  - Assess stoma site  - Consider wound care consult   Outcome: Progressing  Goal: Oral mucous membranes remain intact  Description: INTERVENTIONS  - Assess oral mucosa and hygiene practices  - Implement preventative oral hygiene regimen  - Implement oral medicated treatments as ordered  - Initiate Nutrition services referral as needed  Outcome: Progressing        Problem: RESPIRATORY - ADULT  Goal: Achieves optimal ventilation and oxygenation  Description: INTERVENTIONS:  - Assess for changes in respiratory status  - Assess for changes in mentation and behavior  - Position to facilitate oxygenation and minimize respiratory effort  - Oxygen  administered by appropriate delivery if ordered  - Initiate smoking cessation education as indicated  - Encourage broncho-pulmonary hygiene including cough, deep breathe, Incentive Spirometry  - Assess the need for suctioning and aspirate as needed  - Assess and instruct to report SOB or any respiratory difficulty  - Respiratory Therapy support as indicated  5/16/2025 0206 by Daisha Matson RN  Outcome: Progressing  5/16/2025 0205 by Daisha Matson RN  Outcome: Progressing  5/16/2025 0132 by Daisha Matson RN  Outcome: Progressing     Problem: SAFETY ADULT  Goal: Maintain or return to baseline ADL function  Description: INTERVENTIONS:  -  Assess patient's ability to carry out ADLs; assess patient's baseline for ADL function and identify physical deficits which impact ability to perform ADLs (bathing, care of mouth/teeth, toileting, grooming, dressing, etc.)  - Assess/evaluate cause of self-care deficits   - Assess range of motion  - Assess patient's mobility; develop plan if impaired  - Assess patient's need for assistive devices and provide as appropriate  - Encourage maximum independence but intervene and supervise when necessary  - Involve family in performance of ADLs  - Assess for home care needs following discharge   - Consider OT consult to assist with ADL evaluation and planning for discharge  - Provide patient education as appropriate  - Monitor functional capacity and physical performance, use of AM PAC & JH-HLM   - Monitor gait, balance and fatigue with ambulation    5/16/2025 0206 by Daisha Matson RN  Outcome: Progressing  5/16/2025 0205 by Daisha Matson RN  Outcome: Progressing  5/16/2025 0132 by Daisha Matson RN  Outcome: Progressing

## 2025-05-16 NOTE — CASE MANAGEMENT
Case Management Assessment    Patient name Indio Copeland  Location /-01 MRN [id number]  : 1965 Date 2025       Current Admission Date: 5/15/2025  Current Admission Diagnosis:Dark stools   Patient Active Problem List    Diagnosis Date Noted    Dark stools 05/15/2025    Anemia 05/15/2025    Near syncope 05/15/2025    SIRS (systemic inflammatory response syndrome) (HCC) 05/15/2025      LOS (days): 1  Geometric Mean LOS (GMLOS) (days):   Days to GMLOS:     OBJECTIVE:    Risk of Unplanned Readmission Score: 7.86     Current admission status: Inpatient     Preferred Pharmacy:   [id number] - [place], [place] - [address] [address]  Phone: [phone] Fax: [phone]    Primary Care Provider: No primary care provider on file.    Primary Insurance: BLUE CROSS  Secondary Insurance:     ASSESSMENT:  Active Health Care Proxies    There are no active Health Care Proxies on file.    Advance Directives  Does patient have a Health Care POA?: No  Does patient have Advance Directives?: No    Readmission Root Cause  30 Day Readmission: No    Patient Information  Admitted from:: Home  Mental Status: Alert  During Assessment patient was accompanied by: Not accompanied during assessment  Assessment information provided by:: Patient  Primary Caregiver: Self  Support Systems: Spouse/significant other, Family members  County of Residence: [place]  What [facility] do you live in?: [place]  Type of Current Residence: [address]  Living Arrangements: Lives w/ Spouse/significant other  Is patient a ?: Yes ([employer])  Is [facility] with [facility]: Yes  Is patient service connected?: Yes    Activities of Daily Living Prior to Admission  Functional Status: Independent  Completes ADLs independently?: Yes  Ambulates independently?: Yes  Does patient use assisted devices?: No  Does patient currently own DME?: No  Does patient have a history of  Outpatient Therapy (PT/OT)?: No  Does the patient have a history of Short-Term Rehab?: No  Does patient have a history of HHC?: No  Does patient currently have HHC?: No    Patient Information Continued  Income Source: Pension/prison  Does patient have prescription coverage?: Yes  Can the patient afford their medications and any related supplies (such as glucometers or test strips)?: Yes  Does patient receive dialysis treatments?: No  Does patient have a history of substance abuse?: No  Does patient have a history of Mental Health Diagnosis?: No    Means of Transportation  Means of Transport to Appts:: Drives Self    CM met with the patient bedside to complete CM assessment. The patient reports living with his girlfriend and being independent at baseline. EGD scheduled for this afternoon. The patient denies having unmet needs at home/in community. CM will continue to follow the patient through discharge.

## 2025-05-16 NOTE — ANESTHESIA PREPROCEDURE EVALUATION
Procedure:  EGD    Relevant Problems   HEMATOLOGY   (+) Anemia        Physical Exam    Airway     Mallampati score: II  TM Distance: >3 FB  Neck ROM: full  Upper bite lip test: I  Mouth opening: >= 4 cm      Cardiovascular  Rhythm: regular, Rate: tachycardia, Pulse is palpable.     Dental   No notable dental hx     Pulmonary  Pulmonary exam normal Breath sounds clear to auscultation    Neurological  - normal exam  He appears awake, alert and oriented x3.      Other Findings  post-pubertal.      Anesthesia Plan  ASA Score- 3     Anesthesia Type- IV sedation with anesthesia with ASA Monitors.         Additional Monitors:     Airway Plan: natural airway.           Plan Factors-Exercise tolerance (METS): >4 METS.    Chart reviewed. EKG reviewed. Imaging results reviewed. Existing labs reviewed. Patient summary reviewed.    Patient is not a current smoker.  Patient instructed to abstain from smoking on day of procedure. Patient did not smoke on day of surgery.    Obstructive sleep apnea risk education given perioperatively.        Induction- intravenous.    Postoperative Plan- .   Monitoring Plan - Monitoring plan - standard ASA monitoring      Perioperative Resuscitation Plan - Level 1 - Full Code.       Informed Consent- Anesthetic plan and risks discussed with patient.  I personally reviewed this patient with the CRNA. Discussed and agreed on the Anesthesia Plan with the CRNA..      NPO Status:  Vitals Value Taken Time   Date of last liquid 05/15/25 05/16/25 14:32   Time of last liquid 2355 05/16/25 14:32   Date of last solid 05/15/25 05/16/25 14:32   Time of last solid 0900 05/16/25 14:32

## 2025-05-16 NOTE — PLAN OF CARE
Problem: METABOLIC, FLUID AND ELECTROLYTES - ADULT  Goal: Electrolytes maintained within normal limits  Description: INTERVENTIONS:  - Monitor labs and assess patient for signs and symptoms of electrolyte imbalances  - Administer electrolyte replacement as ordered  - Monitor response to electrolyte replacements, including repeat lab results as appropriate  - Instruct patient on fluid and nutrition as appropriate  Outcome: Progressing  Goal: Fluid balance maintained  Description: INTERVENTIONS:  - Monitor labs   - Monitor I/O and WT  - Instruct patient on fluid and nutrition as appropriate  - Assess for signs & symptoms of volume excess or deficit  Outcome: Progressing    Problem: PAIN - ADULT  Goal: Verbalizes/displays adequate comfort level or baseline comfort level  Description: Interventions:  - Encourage patient to monitor pain and request assistance  - Assess pain using appropriate pain scale  - Administer analgesics as ordered based on type and severity of pain and evaluate response  - Implement non-pharmacological measures as appropriate and evaluate response  - Consider cultural and social influences on pain and pain management  - Notify physician/advanced practitioner if interventions unsuccessful or patient reports new pain  - Educate patient/family on pain management process including their role and importance of  reporting pain   - Provide non-pharmacologic/complimentary pain relief interventions  Outcome: Progressing     Problem: INFECTION - ADULT  Goal: Absence or prevention of progression during hospitalization  Description: INTERVENTIONS:  - Assess and monitor for signs and symptoms of infection  - Monitor lab/diagnostic results  - Monitor all insertion sites, i.e. indwelling lines, tubes, and drains  - Monitor endotracheal if appropriate and nasal secretions for changes in amount and color  - [place] appropriate cooling/warming therapies per order  - Administer medications as ordered  - Instruct  and encourage patient and family to use good hand hygiene technique  - Identify and instruct in appropriate isolation precautions for identified infection/condition  Outcome: Progressing  Goal: Absence of fever/infection during neutropenic period  Description: INTERVENTIONS:  - Monitor WBC  - Perform strict hand hygiene  - Limit to healthy visitors only  - No plants, dried, fresh or silk flowers with wagoner in patient room  Outcome: Progressing            Problem: HEMATOLOGIC - ADULT  Goal: Maintains hematologic stability  Description: INTERVENTIONS  - Assess for signs and symptoms of bleeding or hemorrhage  - Monitor labs  - Administer supportive blood products/factors as ordered and appropriate  Outcome: Progressing           Problem: CARDIOVASCULAR - ADULT  Goal: Maintains optimal cardiac output and hemodynamic stability  Description: INTERVENTIONS:  - Monitor I/O, vital signs and rhythm  - Monitor for S/S and trends of decreased cardiac output  - Administer and titrate ordered vasoactive medications to optimize hemodynamic stability  - Assess quality of pulses, skin color and temperature  - Assess for signs of decreased coronary artery perfusion  - Instruct patient to report change in severity of symptoms  Outcome: Progressing  Goal: Absence of cardiac dysrhythmias or at baseline rhythm  Description: INTERVENTIONS:  - Continuous cardiac monitoring, vital signs, obtain 12 lead EKG if ordered  - Administer antiarrhythmic and heart rate control medications as ordered  - Monitor electrolytes and administer replacement therapy as ordered  Outcome: Progressing

## 2025-05-16 NOTE — ED NOTES
Pt transported to admission floor with RN. 2 nurse verification of blood transfusion done with receiving RNIndra.     Shonna Sheets RN  05/15/25 [id number]

## 2025-05-16 NOTE — PROGRESS NOTES
Progress Note - Hospitalist   Name: Indio Copeland 59 y.o. male I MRN: [id number]  Unit/Bed#: -01 I Date of Admission: 5/15/2025   Date of Service: 5/16/2025 I Hospital Day: 1    Assessment & Plan  Dark stools  Patient reporting dark black stools over the past 2 days and on admission was found to have hemoglobin of 8.0.  Per chart review about 1 year ago most recent hemoglobin appears to be 16.5.  He reports associated lightheadedness and dizziness with near syncopal episode earlier today.  He denies any associated NSAID use, alcohol use, or tobacco use.  Denies any history of GI bleeding.   Had prior colonoscopy 8 years ago with [facility] [place]  with Dr. Reid.  He reports he had 2 colon polyps biopsied which were negative.  And follows for routine screenings.  No records of this in epic  CT abdomen pelvis without acute intra-abdominal pathology  BUN 53; FOBT +  In ED with started on IV Protonix  Patient to receive 1 unit of PRBCs  Concern for upper GI bleed  Continue IV Protonix 40 mg twice daily  Gi consulted  NPO  Plan for EGD today  Transfuse for hgb < 7  Anemia  Hemoglobin noted to be 8.0 down from 16.5 1 year ago.  He is also reporting melanotic stools  Suspect in the setting of acute GI bleed vs KRUNAL  Trend CBC and transfuse for hemoglobin<7  GI consult  SIRS (systemic inflammatory response syndrome) (HCC)  Patient meeting SIRS criteria as evidenced by tachycardia and leukocytosis  He denies any infectious symptoms  CT abdomen pelvis without acute intra-abdominal pathology  Suspect noninfectious etiology in the setting of dehydration/GI bleed/anemia  Hold off on antibiotics  Trend CBC monitor fever curve  Near syncope  Patient reports near syncopal episode after walking up a flight of stairs became lightheaded felt like he was going to pass out  He denies any prodromal symptoms such as chest pain, palpitations, shortness of breath.  Symptoms subsequently resolved after sitting down  Suspect in the setting  of acute anemia/volume depletion  orthostatic vital signs were positive with increase in HR >10 from laying to sitting  Monitor on telemetry  Continue IV fluids      VTE Pharmacologic Prophylaxis:   Moderate Risk (Score 3-4) - Pharmacological DVT Prophylaxis Contraindicated. Sequential Compression Devices Ordered.    Mobility:   Basic Mobility Inpatient Raw Score: 24  JH-HLM Goal: 8: Walk 250 feet or more  JH-HLM Achieved: 6: Walk 10 steps or more  JH-HLM Goal NOT achieved. Continue with multidisciplinary rounding and encourage appropriate mobility to improve upon JH-HLM goals.    Patient Centered Rounds: I performed bedside rounds with nursing staff today.   Discussions with Specialists or Other Care Team Provider: Gi    Education and Discussions with Family / Patient: Patient declined call to .     Current Length of Stay: 1 day(s)  Current Patient Status: Inpatient   Certification Statement: The patient will continue to require additional inpatient hospital stay due to Gi bleed  Discharge Plan: Anticipate discharge in 24-48 hrs to discharge location to be determined pending rehab evaluations.    Code Status: Level 1 - Full Code    Subjective   Pt states he's feeling well, denies any further episodes of rectal bleeding    Objective :  Temp:  [97.7 °F (36.5 °C)-99 °F (37.2 °C)] 98.1 °F (36.7 °C)  HR:  [] 100  BP: (110-140)/(65-82) 131/74  Resp:  [14-18] 18  SpO2:  [96 %-100 %] 96 %  O2 Device: None (Room air)    Body mass index is 29.18 kg/m².     Input and Output Summary (last 24 hours):     Intake/Output Summary (Last 24 hours) at 5/16/2025 1215  Last data filed at 5/16/2025 0501  Gross per 24 hour   Intake 1493.33 ml   Output --   Net 1493.33 ml       Physical Exam  Vitals and nursing note reviewed.   Constitutional:       General: He is not in acute distress.     Appearance: He is well-developed.   HENT:      Head: Normocephalic and atraumatic.     Eyes:      Conjunctiva/sclera: Conjunctivae  normal.       Cardiovascular:      Rate and Rhythm: Normal rate and regular rhythm.      Heart sounds: No murmur heard.  Pulmonary:      Effort: Pulmonary effort is normal. No respiratory distress.      Breath sounds: Normal breath sounds.   Abdominal:      Palpations: Abdomen is soft.      Tenderness: There is no abdominal tenderness.     Musculoskeletal:         General: No swelling.     Skin:     General: Skin is warm and dry.     Neurological:      Mental Status: He is alert. Mental status is at baseline.     Psychiatric:         Mood and Affect: Mood normal.           Lines/Drains:              Lab Results: I have reviewed the following results:   Results from last 7 days   Lab Units 05/16/25  1117 05/16/25  0454 05/15/25  1946   WBC Thousand/uL  --   --  14.08*   HEMOGLOBIN g/dL 7.4*   < > 8.0*   HEMATOCRIT %  --   --  26.3*   PLATELETS Thousands/uL  --   --  204   SEGS PCT %  --   --  78*   LYMPHO PCT %  --   --  12*   MONO PCT %  --   --  9   EOS PCT %  --   --  0    < > = values in this interval not displayed.     Results from last 7 days   Lab Units 05/16/25 0454   SODIUM mmol/L 139   POTASSIUM mmol/L 3.9   CHLORIDE mmol/L 110*   CO2 mmol/L 22   BUN mg/dL 33*   CREATININE mg/dL 0.94   ANION GAP mmol/L 7   CALCIUM mg/dL 8.0*   ALBUMIN g/dL 3.4*   TOTAL BILIRUBIN mg/dL 0.55   ALK PHOS U/L 30*   ALT U/L 17   AST U/L 16   GLUCOSE RANDOM mg/dL 117     Results from last 7 days   Lab Units 05/15/25  1946   INR  1.11             Results from last 7 days   Lab Units 05/16/25 0454 05/15/25  1946   PROCALCITONIN ng/ml 0.16 0.15       Recent Cultures (last 7 days):         Imaging Results Review: I reviewed radiology reports from this admission including: CT abdomen/pelvis.  Other Study Results Review: EKG was reviewed.     Last 24 Hours Medication List:     Current Facility-Administered Medications:     acetaminophen (TYLENOL) tablet 975 mg, Q8H PRN    ondansetron (ZOFRAN) injection 4 mg, Q6H PRN    pantoprazole  (PROTONIX) injection 40 mg, Q12H    sodium chloride 0.9 % infusion, Continuous, Last Rate: 75 mL/hr (05/16/25 4472)    Administrative Statements   Today, Patient Was Seen By: Dontrell Alexander DO      **Please Note: This note may have been constructed using a voice recognition system.**

## 2025-05-16 NOTE — ASSESSMENT & PLAN NOTE
Patient reports near syncopal episode after walking up a flight of stairs became lightheaded felt like he was going to pass out  He denies any prodromal symptoms such as chest pain, palpitations, shortness of breath.  Symptoms subsequently resolved after sitting down  Suspect in the setting of acute anemia  Will check orthostatic vital signs  Monitor on telemetry  Continue IV fluids  Consider pt/ot consutl

## 2025-05-16 NOTE — ANESTHESIA POSTPROCEDURE EVALUATION
Post-Op Assessment Note    CV Status:  Stable  Pain Score: 0    Pain management: adequate       Mental Status:  Arousable and somnolent   Hydration Status:  Euvolemic   PONV Controlled:  Controlled   Airway Patency:  Patent     Post Op Vitals Reviewed: Yes    No anethesia notable event occurred.    Staff: CRNA           Last Filed PACU Vitals:  Vitals Value Taken Time   Temp 99 °F (37.2 °C) 05/16/25 15:34   Pulse 87 05/16/25 15:34   BP 68/42 05/16/25 15:34   Resp 18 05/16/25 15:34   SpO2 95 % 05/16/25 15:34       Modified Mikal:     Vitals Value Taken Time   Activity 2 05/16/25 15:34   Respiration 2 05/16/25 15:34   Circulation 2 05/16/25 15:34   Consciousness 1 05/16/25 15:34   Oxygen Saturation 2 05/16/25 15:34     Modified Mikal Score: 9

## 2025-05-16 NOTE — ASSESSMENT & PLAN NOTE
Patient reports near syncopal episode after walking up a flight of stairs became lightheaded felt like he was going to pass out  He denies any prodromal symptoms such as chest pain, palpitations, shortness of breath.  Symptoms subsequently resolved after sitting down  Suspect in the setting of acute anemia/volume depletion  orthostatic vital signs were positive with increase in HR >10 from laying to sitting  Monitor on telemetry  Continue IV fluids

## 2025-05-16 NOTE — ASSESSMENT & PLAN NOTE
Hemoglobin noted to be 8.0 down from 16.5 1 year ago.  He is also reporting melanotic stools  Suspect in the setting of acute GI bleed vs KRUNAL  Obtain iron panel  Trend CBC and transfuse for hemoglobin<7  GI consult

## 2025-05-16 NOTE — UTILIZATION REVIEW
Initial Clinical Review    Admission: Date/Time/Statement:   Admission Orders (From admission, onward)       Ordered        05/15/25 2140  INPATIENT ADMISSION  Once                          Orders Placed This Encounter   Procedures    INPATIENT ADMISSION     Standing Status:   Standing     Number of Occurrences:   1     Level of Care:   Med Surg [16]     Estimated length of stay:   More than 2 Midnights     Certification:   I certify that inpatient services are medically necessary for this patient for a duration of greater than two midnights. See H&P and MD Progress Notes for additional information about the patient's course of treatment.     ED Arrival Information       Expected   -    Arrival   5/15/2025 19:31    Acuity   Emergent              Means of arrival   Walk-In    Escorted by   Family Member    Service   Hospitalist    Admission type   Emergency              Arrival complaint   dizziness             Chief Complaint   Patient presents with    Weakness - Generalized     Reports generalized weakness, dizziness, abd pain, N/V, black stools x2 days. -thinners.        Initial Presentation: 59 y.o. male with no significant PMH, who presented from home to [facility] ED. Admitted as Inpatient for evaluation and treatment of Dark stools, Anemia, SIRS, Near Syncope.       Presented w/ anemia and dark stools x2 days. He reports that this morning he felt very tired and lightheaded and throughout the day symptoms got progressively worse. He notes that when he was trying to get up the steps he felt as if he was going to pass out due to lightheadedness. He reports that his symptoms resolved after resting. He has been eating and drinking okay however today has only been able to tolerate liquids.  On exam, abd soft, distended, no tenderness. Abnormal Labs Bun 53, Glucose 207, Alk phos 31, T protein 5.5, WBC 14.08, H/H 8.0/26.3, Occult blood Positive. CT abd/pelvis w/o acute findings. Please see below med list for  meds given in the ED.     Plan: Transfuse 1 unit PRBC, NPO at MN, IV Protonix, Hold NSAIDs/AC at this time, Monitor H/H and transfuse for Hgb <7.  Hold off on abx, Trend CBC and fever.  Check orthostatics, Monitor Tele, IV fluids, consider PT/OT evals. GI consulted.     Anticipated Length of Stay/Certification Statement: Patient will be admitted on an inpatient basis with an anticipated length of stay of greater than 2 midnights secondary to symptomatic anemia, melena/GI bleed requiring GI consultation, hemodynamic monitoring, near syncope, SIRS.     Date: 5/16/25   Day 2:   Pt denies any further episodes of rectal bleeding. orthostatic vital signs were positive with increase in HR >10 from laying to sittingNo deficits on exam. Abnormal labs: Bun 33, Alk phos 30, T Protein 4.9, Hgb 7.8. Plan: See below GI plans. Monitor on Tele, Continue IV fluids.     GI consult: Melena, anemia. Plan: Keep NPO, Monitor H/H, transfuse as needed, IV PPI BID, EGD this afternoon, if no etiology found for anemia/melan on EGD may need colonoscopy.      Certification Statement: The patient will continue to require additional inpatient hospital stay due to Gi bleed     ED Treatment-Medication Administration from 05/15/2025 1931 to 05/15/2025 2228         Date/Time Order Dose Route Action     05/15/2025 1947 sodium chloride 0.9 % bolus 1,000 mL 1,000 mL Intravenous New Bag     05/15/2025 2003 pantoprazole (PROTONIX) 80 mg in sodium chloride 0.9 % 100 mL IVPB 80 mg Intravenous New Bag     05/15/2025 2028 iohexol (OMNIPAQUE) 350 MG/ML injection (SINGLE-DOSE) 90 mL 90 mL Intravenous Given            Scheduled Medications:  pantoprazole, 40 mg, Intravenous, Q12H    Continuous IV Infusions: NONE     PRN Meds:  acetaminophen, 975 mg, Oral, Q8H PRN  ondansetron, 4 mg, Intravenous, Q6H PRN      ED Triage Vitals   Temperature Pulse Respirations Blood Pressure SpO2 Pain Score   05/15/25 1938 05/15/25 1938 05/15/25 1938 05/15/25 1938 05/15/25 1938  05/15/25 2234   97.9 °F (36.6 °C) (!) 109 18 125/70 100 % No Pain     Weight (last 2 days)       Date/Time Weight    05/15/25 2234 84.5 (186.29)            Vital Signs (last 3 days)       Date/Time Temp Pulse Resp BP MAP (mmHg) SpO2 O2 Device Patient Position - Orthostatic VS [place] Coma Scale Score Pain    05/16/25 0322 97.7 °F (36.5 °C) 88 18 120/71 86 100 % None (Room air) Lying -- --    05/16/25 0002 -- 112 18 115/69 -- 100 % None (Room air) Standing - Orthostatic VS -- --    05/16/25 0001 -- 101 18 118/70 -- 100 % None (Room air) Sitting - Orthostatic VS -- --    05/16/25 0000 98.5 °F (36.9 °C) 90 18 124/71 -- 100 % None (Room air) Lying - Orthostatic VS -- --    05/15/25 2306 98.4 °F (36.9 °C) 93 18 140/82 -- 100 % None (Room air) -- -- --    05/15/25 2234 -- -- -- -- -- -- None (Room air) -- 15 No Pain    05/15/25 2155 98.8 °F (37.1 °C) 95 16 118/74 91 99 % None (Room air) -- -- --    05/15/25 2125 99 °F (37.2 °C) -- -- 119/71 -- -- None (Room air) -- -- --    05/15/25 2120 99 °F (37.2 °C) 99 16 114/66 -- 99 % None (Room air) -- -- --    05/15/25 2115 99 °F (37.2 °C) -- 14 110/65 -- -- None (Room air) -- -- --    05/15/25 2058 98.7 °F (37.1 °C) 104 16 118/71 -- -- -- -- -- --    05/15/25 2030 -- 104 14 130/79 98 100 % -- -- -- --    05/15/25 2015 -- 100 16 124/67 86 99 % -- -- -- --    05/15/25 2000 -- 99 14 122/73 92 99 % -- -- -- --    05/15/25 1945 -- -- -- -- -- -- -- -- 15 --    05/15/25 1938 97.9 °F (36.6 °C) 109 18 125/70 90 100 % None (Room air) Lying -- --              Pertinent Labs/Diagnostic Test Results:   Radiology:  CT abdomen pelvis with contrast   Final Interpretation by Indio Hernandez MD (05/15 2059)      No acute inflammatory process identified in the abdomen or pelvis.      Stable nonobstructing left-sided intrarenal calculi measuring up to 8 x 5 mm without hydronephrosis.         Workstation performed: [id number]           Cardiology:  ECG 12 lead    by Interface, Ris Results In (05/15  1938)            Results from last 7 days   Lab Units 05/16/25 0454 05/15/25  1946   WBC Thousand/uL  --  14.08*   HEMOGLOBIN g/dL 7.8* 8.0*   HEMATOCRIT %  --  26.3*   PLATELETS Thousands/uL  --  204   TOTAL NEUT ABS Thousands/µL  --  10.88*         Results from last 7 days   Lab Units 05/16/25 0454 05/15/25  1946   SODIUM mmol/L 139 137   POTASSIUM mmol/L 3.9 3.9   CHLORIDE mmol/L 110* 106   CO2 mmol/L 22 22   ANION GAP mmol/L 7 9   BUN mg/dL 33* 53*   CREATININE mg/dL 0.94 1.06   EGFR ml/min/1.73sq m 87 75   CALCIUM mg/dL 8.0* 8.4     Results from last 7 days   Lab Units 05/16/25  0454 05/15/25  1946   AST U/L 16 17   ALT U/L 17 18   ALK PHOS U/L 30* 31*   TOTAL PROTEIN g/dL 4.9* 5.5*   ALBUMIN g/dL 3.4* 3.7   TOTAL BILIRUBIN mg/dL 0.55 0.31         Results from last 7 days   Lab Units 05/16/25 0454 05/15/25  1946   GLUCOSE RANDOM mg/dL 117 207*     Results from last 7 days   Lab Units 05/15/25  1946   PROTIME seconds 14.7   INR  1.11   PTT seconds 21*         Results from last 7 days   Lab Units 05/15/25  1946   PROCALCITONIN ng/ml 0.15     Results from last 7 days   Lab Units 05/16/25 0630   UNIT PRODUCT CODE  [id number]   UNIT NUMBER  [id number]   UNITABO  B   UNITRH  NEG   CROSSMATCH  Compatible   UNIT DISPENSE STATUS  Presumed Trans   UNIT PRODUCT VOL ml 350         Results from last 7 days   Lab Units 05/15/25  1946   LIPASE u/L 43     History reviewed. No pertinent past medical history.  Present on Admission:  **None**      Admitting Diagnosis: Melena [K92.1]  Weakness [R53.1]  Anemia [D64.9]  Near syncope [R55]  Generalized weakness [R53.1]  Age/Sex: 59 y.o. male    Network Utilization Review Department  ATTENTION: Please call with any questions or concerns to [phone] and carefully listen to the prompts so that you are directed to the right person. All voicemails are confidential.   For Discharge needs, contact Care Management DC Support Team at [phone] opt. 2  Send all requests for  admission clinical reviews, approved or denied determinations and any other requests to dedicated fax number below belonging to the campus where the patient is receiving treatment. List of dedicated fax numbers for the Facilities:  FACILITY NAME UR FAX NUMBER   ADMISSION DENIALS (Administrative/Medical Necessity) [phone]   DISCHARGE SUPPORT TEAM (NETWORK) [phone]   PARENT CHILD HEALTH (Maternity/NICU/Pediatrics) [phone]   [facility] [phone]   [facility] [phone]   [facility] [phone]   [facility] [phone]   [facility] [phone]   [facility] [phone]   General acute hospital [phone]   [facility] [phone]   [facility] [phone]   [facility] [phone]   [facility] [phone]   [facility] [phone]

## 2025-05-16 NOTE — ASSESSMENT & PLAN NOTE
Hemoglobin noted to be 8.0 down from 16.5 1 year ago.  He is also reporting melanotic stools  Suspect in the setting of acute GI bleed vs KRUNAL  Trend CBC and transfuse for hemoglobin<7  GI consult

## 2025-05-16 NOTE — ASSESSMENT & PLAN NOTE
Patient meeting SIRS criteria as evidenced by tachycardia and leukocytosis  He denies any infectious symptoms  CT abdomen pelvis without acute intra-abdominal pathology  Suspect noninfectious etiology in the setting of dehydration/GI bleed/anemia  Hold off on antibiotics  Trend CBC monitor fever curve

## 2025-05-16 NOTE — CONSULTS
Consultation - Gastroenterology   Name: Indio Copeland 59 y.o. male I MRN: [id number]  Unit/Bed#: -01 I Date of Admission: 5/15/2025   Date of Service: 5/16/2025 I Hospital Day: 1   Inpatient consult to gastroenterology  Consult performed by: HUNTER Mcclellan  Consult ordered by: HUNTER Buckner      Physician Requesting Evaluation: Dontrell Alexander DO   Reason for Evaluation / Principal Problem: Melena, anemia      This is a 60 y/o male with no significant PMH who presented 5/15 due to progressive weakness/lightheadedness following 3 episodes of melena with associated stomachache on Wednesday for which he took PeptoBismol. Noted with Hgb 8 from prior BL 16.5 in December 2023 with BUN elevation 53.  Assessment & Plan  Dark stools  Pt with melena x3 day prior to admission noted with drop in Hgb and elevated BUN concerning for UGI bleed from PUD, AVM, or lesion. He had a dull stomachache when symptoms started but this resolved after taking PeptoBismol and he has not had any further BM since Wednesday. Denies any NSAID use or frequent alcohol use & not on any anticoagulation. CTAP with no acute findings.  Remains HDS, Hgb 7.8 this AM stable from 8 on admission, BUN trending down.     -Keep NPO  -Maintain large bore IV access  -Monitor serial H&H, transfuse blood products as needed  -Continue supportive care with IV hydration  -Continue IV PPI BID  -Will schedule EGD this afternoon for further evaluation.  -If no etiology of anemia/melena found on EGD, may need colonoscopy   -Follow up iron studies, iron repletion per primary team  Anemia  See above  Near syncope        History of Present Illness   HPI:  Indio Copeland is a 59 y.o. male with no significant past medical history who presented 5/15 due to progressive fatigue and lightheadedness and melena.  He reports on Wednesday he had a dull stomach ache and had 3 episodes of dark tarry stool. He then took some PeptoBismol and didn't have  any further BM on Thursday, but felt extremely fatigued/lightheaded so presented to the ED for further evaluation.    In the ED, he was tachycardic but normotensive, noted with new anemia with hemoglobin of 8 from prior baseline of 16.5 in December 2023 and elevated BUN 53.  CAT scan abdomen pelvis performed showing no acute inflammatory process in the abdomen or pelvis and stable left-sided intrarenal calculi measuring 8 x5mm w/o hydronephrosis.He was started on IVF and a protonix and made NPO for GI evaluation.     He hasn't had any further melena since Wednesday. Denies any further abdominal pain. Denies any nausea/vomiting.    He denies any illicit drug use, tobacco use, frequent NSAID use, or frequent alcohol use.   Denies any hx of abdominal surgeries  Denies family hx of GI related malignancy  He follows with Dr. Reid  for his colonoscopy    Review of Systems   Constitutional:  Positive for appetite change and fatigue.   Gastrointestinal:  Positive for abdominal pain and blood in stool. Negative for nausea and vomiting.   Neurological:  Positive for weakness and light-headedness.         Objective :  Temp:  [97.7 °F (36.5 °C)-99 °F (37.2 °C)] 98 °F (36.7 °C)  HR:  [] 89  BP: (110-140)/(65-82) 112/65  Resp:  [14-18] 16  SpO2:  [99 %-100 %] 100 %  O2 Device: None (Room air)    Physical Exam  Vitals and nursing note reviewed.   Constitutional:       General: He is not in acute distress.     Appearance: He is well-developed.   HENT:      Head: Normocephalic and atraumatic.     Eyes:      Conjunctiva/sclera: Conjunctivae normal.       Cardiovascular:      Rate and Rhythm: Normal rate and regular rhythm.      Heart sounds: No murmur heard.  Pulmonary:      Effort: Pulmonary effort is normal. No respiratory distress.      Breath sounds: Normal breath sounds.   Abdominal:      Palpations: Abdomen is soft.      Tenderness: There is no abdominal tenderness.     Musculoskeletal:         General: No swelling.       Cervical back: Neck supple.     Skin:     General: Skin is warm and dry.      Capillary Refill: Capillary refill takes less than 2 seconds.     Neurological:      Mental Status: He is alert and oriented to person, place, and time.     Psychiatric:         Mood and Affect: Mood normal.           Lab Results: I have reviewed the following results:

## 2025-05-16 NOTE — PLAN OF CARE
Problem: PAIN - ADULT  Goal: Verbalizes/displays adequate comfort level or baseline comfort level  Description: Interventions:  - Encourage patient to monitor pain and request assistance  - Assess pain using appropriate pain scale  - Administer analgesics as ordered based on type and severity of pain and evaluate response  - Implement non-pharmacological measures as appropriate and evaluate response  - Consider cultural and social influences on pain and pain management  - Notify physician/advanced practitioner if interventions unsuccessful or patient reports new pain  - Educate patient/family on pain management process including their role and importance of  reporting pain   - Provide non-pharmacologic/complimentary pain relief interventions  Outcome: Progressing     Problem: INFECTION - ADULT  Goal: Absence or prevention of progression during hospitalization  Description: INTERVENTIONS:  - Assess and monitor for signs and symptoms of infection  - Monitor lab/diagnostic results  - Monitor all insertion sites, i.e. indwelling lines, tubes, and drains  - Monitor endotracheal if appropriate and nasal secretions for changes in amount and color  - [place] appropriate cooling/warming therapies per order  - Administer medications as ordered  - Instruct and encourage patient and family to use good hand hygiene technique  - Identify and instruct in appropriate isolation precautions for identified infection/condition  Outcome: Progressing     Problem: SAFETY ADULT  Goal: Patient will remain free of falls  Description: INTERVENTIONS:  - Educate patient/family on patient safety including physical limitations  - Instruct patient to call for assistance with activity   - Consider consulting OT/PT to assist with strengthening/mobility based on AM PAC & JH-HLM score  - Consult OT/PT to assist with strengthening/mobility   - Keep Call bell within reach  - Keep bed low and locked with side rails adjusted as appropriate  - Keep  care items and personal belongings within reach  - Initiate and maintain comfort rounds  - Make Fall Risk Sign visible to staff  - Apply yellow socks and bracelet for high fall risk patients  - Consider moving patient to room near nurses station  Outcome: Progressing  Goal: Maintain or return to baseline ADL function  Description: INTERVENTIONS:  -  Assess patient's ability to carry out ADLs; assess patient's baseline for ADL function and identify physical deficits which impact ability to perform ADLs (bathing, care of mouth/teeth, toileting, grooming, dressing, etc.)  - Assess/evaluate cause of self-care deficits   - Assess range of motion  - Assess patient's mobility; develop plan if impaired  - Assess patient's need for assistive devices and provide as appropriate  - Encourage maximum independence but intervene and supervise when necessary  - Involve family in performance of ADLs  - Assess for home care needs following discharge   - Consider OT consult to assist with ADL evaluation and planning for discharge  - Provide patient education as appropriate  - Monitor functional capacity and physical performance, use of AM PAC & JH-HLM   - Monitor gait, balance and fatigue with ambulation    Outcome: Progressing  Goal: Maintains/Returns to pre admission functional level  Description: INTERVENTIONS:  - Perform AM-PAC 6 Click Basic Mobility/ Daily Activity assessment daily.  - Set and communicate daily mobility goal to care team and patient/family/caregiver.   - Collaborate with rehabilitation services on mobility goals if consulted  - Out of bed for toileting  - Record patient progress and toleration of activity level   Outcome: Progressing     Problem: DISCHARGE PLANNING  Goal: Discharge to home or other facility with appropriate resources  Description: INTERVENTIONS:  - Identify barriers to discharge w/patient and caregiver  - Arrange for needed discharge resources and transportation as appropriate  - Identify  discharge learning needs (meds, wound care, etc.)  - Arrange for interpretive services to assist at discharge as needed  - Refer to Case Management Department for coordinating discharge planning if the patient needs post-hospital services based on physician/advanced practitioner order or complex needs related to functional status, cognitive ability, or social support system  Outcome: Progressing     Problem: CARDIOVASCULAR - ADULT  Goal: Maintains optimal cardiac output and hemodynamic stability  Description: INTERVENTIONS:  - Monitor I/O, vital signs and rhythm  - Monitor for S/S and trends of decreased cardiac output  - Administer and titrate ordered vasoactive medications to optimize hemodynamic stability  - Assess quality of pulses, skin color and temperature  - Assess for signs of decreased coronary artery perfusion  - Instruct patient to report change in severity of symptoms  Outcome: Progressing  Goal: Absence of cardiac dysrhythmias or at baseline rhythm  Description: INTERVENTIONS:  - Continuous cardiac monitoring, vital signs, obtain 12 lead EKG if ordered  - Administer antiarrhythmic and heart rate control medications as ordered  - Monitor electrolytes and administer replacement therapy as ordered  Outcome: Progressing     Problem: RESPIRATORY - ADULT  Goal: Achieves optimal ventilation and oxygenation  Description: INTERVENTIONS:  - Assess for changes in respiratory status  - Assess for changes in mentation and behavior  - Position to facilitate oxygenation and minimize respiratory effort  - Oxygen administered by appropriate delivery if ordered  - Initiate smoking cessation education as indicated  - Encourage broncho-pulmonary hygiene including cough, deep breathe, Incentive Spirometry  - Assess the need for suctioning and aspirate as needed  - Assess and instruct to report SOB or any respiratory difficulty  - Respiratory Therapy support as indicated  Outcome: Progressing     Problem: METABOLIC, FLUID  AND ELECTROLYTES - ADULT  Goal: Electrolytes maintained within normal limits  Description: INTERVENTIONS:  - Monitor labs and assess patient for signs and symptoms of electrolyte imbalances  - Administer electrolyte replacement as ordered  - Monitor response to electrolyte replacements, including repeat lab results as appropriate  - Instruct patient on fluid and nutrition as appropriate  Outcome: Progressing  Goal: Fluid balance maintained  Description: INTERVENTIONS:  - Monitor labs   - Monitor I/O and WT  - Instruct patient on fluid and nutrition as appropriate  - Assess for signs & symptoms of volume excess or deficit  Outcome: Progressing  Goal: Glucose maintained within target range  Description: INTERVENTIONS:  - Monitor Blood Glucose as ordered  - Assess for signs and symptoms of hyperglycemia and hypoglycemia  - Administer ordered medications to maintain glucose within target range  - Assess nutritional intake and initiate nutrition service referral as needed  Outcome: Progressing     Problem: HEMATOLOGIC - ADULT  Goal: Maintains hematologic stability  Description: INTERVENTIONS  - Assess for signs and symptoms of bleeding or hemorrhage  - Monitor labs  - Administer supportive blood products/factors as ordered and appropriate  Outcome: Progressing     Problem: GASTROINTESTINAL - ADULT  Goal: Minimal or absence of nausea and/or vomiting  Description: INTERVENTIONS:  - Administer IV fluids if ordered to ensure adequate hydration  - Maintain NPO status until nausea and vomiting are resolved  - Nasogastric tube if ordered  - Administer ordered antiemetic medications as needed  - Provide nonpharmacologic comfort measures as appropriate  - Advance diet as tolerated, if ordered  - Consider nutrition services referral to assist patient with adequate nutrition and appropriate food choices  Outcome: Progressing  Goal: Maintains or returns to baseline bowel function  Description: INTERVENTIONS:  - Assess bowel  function  - Encourage oral fluids to ensure adequate hydration  - Administer IV fluids if ordered to ensure adequate hydration  - Administer ordered medications as needed  - Encourage mobilization and activity  - Consider nutritional services referral to assist patient with adequate nutrition and appropriate food choices  Outcome: Progressing  Goal: Maintains adequate nutritional intake  Description: INTERVENTIONS:  - Monitor percentage of each meal consumed  - Identify factors contributing to decreased intake, treat as appropriate  - Assist with meals as needed  - Monitor I&O, weight, and lab values if indicated  - Obtain nutrition services referral as needed  Outcome: Progressing  Goal: Establish and maintain optimal ostomy function  Description: INTERVENTIONS:  - Assess bowel function  - Encourage oral fluids to ensure adequate hydration  - Administer IV fluids if ordered to ensure adequate hydration   - Administer ordered medications as needed  - Encourage mobilization and activity  - Nutrition services referral to assist patient with appropriate food choices  - Assess stoma site  - Consider wound care consult   Outcome: Progressing  Goal: Oral mucous membranes remain intact  Description: INTERVENTIONS  - Assess oral mucosa and hygiene practices  - Implement preventative oral hygiene regimen  - Implement oral medicated treatments as ordered  - Initiate Nutrition services referral as needed  Outcome: Progressing

## 2025-05-16 NOTE — ASSESSMENT & PLAN NOTE
Patient reporting dark black stools over the past 2 days and on admission was found to have hemoglobin of 8.0.  Per chart review about 1 year ago most recent hemoglobin appears to be 16.5.  He reports associated lightheadedness and dizziness with near syncopal episode earlier today.  He denies any associated NSAID use, alcohol use, or tobacco use.  Denies any history of GI bleeding.   Had prior colonoscopy 8 years ago with [facility] with Dr. Reid.  He reports he had 2 colon polyps biopsied which were negative.  And follows for routine screenings.  No records of this in epic  CT abdomen pelvis without acute intra-abdominal pathology  BUN 53; FOBT +  In ED with started on IV Protonix  Patient to receive 1 unit of PRBCs  Will make patient n.p.o. at midnight  Concern for upper GI bleed  Continue IV Protonix 40 mg twice daily  GI evaluation in the morning  Hold NSAIDs/AC at this time  Transfuse for hgb < 7

## 2025-05-16 NOTE — ASSESSMENT & PLAN NOTE
Patient reporting dark black stools over the past 2 days and on admission was found to have hemoglobin of 8.0.  Per chart review about 1 year ago most recent hemoglobin appears to be 16.5.  He reports associated lightheadedness and dizziness with near syncopal episode earlier today.  He denies any associated NSAID use, alcohol use, or tobacco use.  Denies any history of GI bleeding.   Had prior colonoscopy 8 years ago with [facility] with Dr. Reid.  He reports he had 2 colon polyps biopsied which were negative.  And follows for routine screenings.  No records of this in epic  CT abdomen pelvis without acute intra-abdominal pathology  BUN 53; FOBT +  In ED with started on IV Protonix  Patient to receive 1 unit of PRBCs  Concern for upper GI bleed  Continue IV Protonix 40 mg twice daily  Gi consulted  NPO  Plan for EGD today  Transfuse for hgb < 7

## 2025-05-17 VITALS
TEMPERATURE: 98.1 F | WEIGHT: 186.29 LBS | RESPIRATION RATE: 16 BRPM | OXYGEN SATURATION: 100 % | SYSTOLIC BLOOD PRESSURE: 117 MMHG | HEIGHT: 67 IN | HEART RATE: 91 BPM | BODY MASS INDEX: 29.24 KG/M2 | DIASTOLIC BLOOD PRESSURE: 75 MMHG

## 2025-05-17 LAB
ABO GROUP BLD BPU: NORMAL
ALBUMIN SERPL BCG-MCNC: 3.3 G/DL (ref 3.5–5)
ANION GAP SERPL CALCULATED.3IONS-SCNC: 5 MMOL/L (ref 4–13)
BPU ID: NORMAL
BUN SERPL-MCNC: 24 MG/DL (ref 5–25)
CALCIUM ALBUM COR SERPL-MCNC: 8.6 MG/DL (ref 8.3–10.1)
CALCIUM SERPL-MCNC: 8 MG/DL (ref 8.4–10.2)
CHLORIDE SERPL-SCNC: 109 MMOL/L (ref 96–108)
CO2 SERPL-SCNC: 24 MMOL/L (ref 21–32)
CREAT SERPL-MCNC: 0.97 MG/DL (ref 0.6–1.3)
CROSSMATCH: NORMAL
ERYTHROCYTE [DISTWIDTH] IN BLOOD BY AUTOMATED COUNT: 20 % (ref 11.6–15.1)
GFR SERPL CREATININE-BSD FRML MDRD: 83 ML/MIN/1.73SQ M
GLUCOSE SERPL-MCNC: 105 MG/DL (ref 65–140)
HCT VFR BLD AUTO: 26.4 % (ref 36.5–49.3)
HCT VFR BLD AUTO: 26.7 % (ref 36.5–49.3)
HGB BLD-MCNC: 8.4 G/DL (ref 12–17)
HGB BLD-MCNC: 8.4 G/DL (ref 12–17)
HGB BLD-MCNC: 8.5 G/DL (ref 12–17)
MAGNESIUM SERPL-MCNC: 2 MG/DL (ref 1.9–2.7)
MCH RBC QN AUTO: 27.1 PG (ref 26.8–34.3)
MCHC RBC AUTO-ENTMCNC: 32.2 G/DL (ref 31.4–37.4)
MCV RBC AUTO: 84 FL (ref 82–98)
PHOSPHATE SERPL-MCNC: 2.9 MG/DL (ref 2.3–4.1)
PLATELET # BLD AUTO: 128 THOUSANDS/UL (ref 149–390)
PMV BLD AUTO: 8.9 FL (ref 8.9–12.7)
POTASSIUM SERPL-SCNC: 4 MMOL/L (ref 3.5–5.3)
RBC # BLD AUTO: 3.14 MILLION/UL (ref 3.88–5.62)
SODIUM SERPL-SCNC: 138 MMOL/L (ref 135–147)
UNIT DISPENSE STATUS: NORMAL
UNIT PRODUCT CODE: NORMAL
UNIT PRODUCT VOLUME: 350 ML
UNIT RH: NORMAL
WBC # BLD AUTO: 7.24 THOUSAND/UL (ref 4.31–10.16)

## 2025-05-17 PROCEDURE — 83735 ASSAY OF MAGNESIUM: CPT

## 2025-05-17 PROCEDURE — 85027 COMPLETE CBC AUTOMATED: CPT

## 2025-05-17 PROCEDURE — 85014 HEMATOCRIT: CPT | Performed by: STUDENT IN AN ORGANIZED HEALTH CARE EDUCATION/TRAINING PROGRAM

## 2025-05-17 PROCEDURE — 80069 RENAL FUNCTION PANEL: CPT

## 2025-05-17 PROCEDURE — 85018 HEMOGLOBIN: CPT | Performed by: STUDENT IN AN ORGANIZED HEALTH CARE EDUCATION/TRAINING PROGRAM

## 2025-05-17 PROCEDURE — 85018 HEMOGLOBIN: CPT

## 2025-05-17 PROCEDURE — 99239 HOSP IP/OBS DSCHRG MGMT >30: CPT | Performed by: STUDENT IN AN ORGANIZED HEALTH CARE EDUCATION/TRAINING PROGRAM

## 2025-05-17 RX ORDER — PANTOPRAZOLE SODIUM 40 MG/1
40 TABLET, DELAYED RELEASE ORAL
Status: DISCONTINUED | OUTPATIENT
Start: 2025-05-17 | End: 2025-05-17 | Stop reason: HOSPADM

## 2025-05-17 RX ORDER — PANTOPRAZOLE SODIUM 40 MG/1
40 TABLET, DELAYED RELEASE ORAL
Qty: 180 TABLET | Refills: 0 | Status: SHIPPED | OUTPATIENT
Start: 2025-05-17

## 2025-05-17 RX ADMIN — PANTOPRAZOLE SODIUM 40 MG: 40 INJECTION, POWDER, FOR SOLUTION INTRAVENOUS at 08:56

## 2025-05-17 NOTE — PLAN OF CARE
Problem: PAIN - ADULT  Goal: Verbalizes/displays adequate comfort level or baseline comfort level  Description: Interventions:  - Encourage patient to monitor pain and request assistance  - Assess pain using appropriate pain scale  - Administer analgesics as ordered based on type and severity of pain and evaluate response  - Implement non-pharmacological measures as appropriate and evaluate response  - Consider cultural and social influences on pain and pain management  - Notify physician/advanced practitioner if interventions unsuccessful or patient reports new pain  - Educate patient/family on pain management process including their role and importance of  reporting pain   - Provide non-pharmacologic/complimentary pain relief interventions  Outcome: Progressing     Problem: INFECTION - ADULT  Goal: Absence or prevention of progression during hospitalization  Description: INTERVENTIONS:  - Assess and monitor for signs and symptoms of infection  - Monitor lab/diagnostic results  - Monitor all insertion sites, i.e. indwelling lines, tubes, and drains  - Monitor endotracheal if appropriate and nasal secretions for changes in amount and color  - [place] appropriate cooling/warming therapies per order  - Administer medications as ordered  - Instruct and encourage patient and family to use good hand hygiene technique  - Identify and instruct in appropriate isolation precautions for identified infection/condition  Outcome: Progressing     Problem: Knowledge Deficit  Goal: Patient/family/caregiver demonstrates understanding of disease process, treatment plan, medications, and discharge instructions  Description: Complete learning assessment and assess knowledge base.  Interventions:  - Provide teaching at level of understanding  - Provide teaching via preferred learning methods  Outcome: Progressing

## 2025-05-17 NOTE — NURSING NOTE
Discharge instructions reviewed with pt.  Verbalized understanding.  Accompanied down to lobby by PCA and assisted into vehicle.  Stable upon discharge.

## 2025-05-17 NOTE — DISCHARGE INSTR - AVS FIRST PAGE
Dear [place],    You are being prescribed protonix 40 mg twice daily to prevent any further stomach bleeding  Please avoid all NSAID's like ibuprofen, advil, midol  You can take tylenol if you have pain    Your EGD showed  IMPRESSION:  Irregular Z-line  Mild erythematous mucosa in the antrum  One 25 mm polyp in the duodenal bulb; performed cold forceps biopsy with partial removal; tissue was ablated with argon plasma coagulation  The esophagus, stomach, 1st part of the duodenum and 2nd part of the duodenum appeared normal.        RECOMMENDATION:  Await pathology results   Await pathology results   No ulcer disease and/or varices seen.  Very large polyp in duodenal bulb which was very friable erythematous and hemorrhagic, most likely the source of bleeding.  This will need further evaluation probably by evaluation of advanced endoscopist with the EUS and possible resection.  Patient's admission CBC indices suggestive of more of a chronic blood loss.  Now advance the diet as tolerated.  Keep on PPI.     Gi office will call you to schedule outpatient advanced EGD    Best of Dr. Dontrell bailey

## 2025-05-17 NOTE — PLAN OF CARE
Problem: METABOLIC, FLUID AND ELECTROLYTES - ADULT  Goal: Electrolytes maintained within normal limits  Description: INTERVENTIONS:  - Monitor labs and assess patient for signs and symptoms of electrolyte imbalances  - Administer electrolyte replacement as ordered  - Monitor response to electrolyte replacements, including repeat lab results as appropriate  - Instruct patient on fluid and nutrition as appropriate  Outcome: Progressing  Goal: Fluid balance maintained  Description: INTERVENTIONS:  - Monitor labs   - Monitor I/O and WT  - Instruct patient on fluid and nutrition as appropriate  - Assess for signs & symptoms of volume excess or deficit  Outcome: Progressing  Goal: Glucose maintained within target range  Description: INTERVENTIONS:  - Monitor Blood Glucose as ordered  - Assess for signs and symptoms of hyperglycemia and hypoglycemia  - Administer ordered medications to maintain glucose within target range  - Assess nutritional intake and initiate nutrition service referral as needed  Outcome: Progressing     Problem: RESPIRATORY - ADULT  Goal: Achieves optimal ventilation and oxygenation  Description: INTERVENTIONS:  - Assess for changes in respiratory status  - Assess for changes in mentation and behavior  - Position to facilitate oxygenation and minimize respiratory effort  - Oxygen administered by appropriate delivery if ordered  - Initiate smoking cessation education as indicated  - Encourage broncho-pulmonary hygiene including cough, deep breathe, Incentive Spirometry  - Assess the need for suctioning and aspirate as needed  - Assess and instruct to report SOB or any respiratory difficulty  - Respiratory Therapy support as indicated  Outcome: Progressing     Problem: HEMATOLOGIC - ADULT  Goal: Maintains hematologic stability  Description: INTERVENTIONS  - Assess for signs and symptoms of bleeding or hemorrhage  - Monitor labs  - Administer supportive blood products/factors as ordered and  appropriate  Outcome: Progressing

## 2025-05-17 NOTE — ASSESSMENT & PLAN NOTE
Patient presents with black stools for 2 days prior to admission. Found to be anemic with Hg of 8 in the ED. Per chart review about 1 year ago most recent hemoglobin appears to be 16.5.  He reports associated lightheadedness and dizziness with near syncopal episode prior to presentation.  Denies any history of GI bleeding.   Had prior colonoscopy 8 years ago with [facility] with Dr. Reid.  He reports he had 2 colon polyps biopsied which were negative.  And follows for routine screenings.  No records of this in epic  CT abdomen pelvis without acute intra-abdominal pathology  BUN 53; FOBT +  In ED with started on IV Protonix, then continued with protonix 40 mg bid  Patient to receive 1 unit of PRBCs  Gi consulted  S/P EGD 5/16 showing mild erythematous mucosa in the antrum, one 25 mm polyp in the duodenal bulb; performed cold forceps biopsy with partial removal; tissue was ablated with argon plasma coagulation  Patient will need further evaluation by advanced endoscopist with EUS and possible resection for which Gi will arrange  Hg after EGD 6.9. Patient given 1 unit of PRBC for total of 2 units  Hg has remained stable at 8.4 x 2 today  Gi recommends continuing protonix 40 mg bid on discharge

## 2025-05-17 NOTE — DISCHARGE SUMMARY
Discharge Summary - Hospitalist   Name: Indio Copeland 59 y.o. male I MRN: [id number]  Unit/Bed#: -01 I Date of Admission: 5/15/2025   Date of Service: 5/17/2025 I Hospital Day: 2     Assessment & Plan  Dark stools  Patient presents with black stools for 2 days prior to admission. Found to be anemic with Hg of 8 in the ED. Per chart review about 1 year ago most recent hemoglobin appears to be 16.5.  He reports associated lightheadedness and dizziness with near syncopal episode prior to presentation.  Denies any history of GI bleeding.   Had prior colonoscopy 8 years ago with [facility]  with Dr. Reid.  He reports he had 2 colon polyps biopsied which were negative.  And follows for routine screenings.  No records of this in epic  CT abdomen pelvis without acute intra-abdominal pathology  BUN 53; FOBT +  In ED with started on IV Protonix, then continued with protonix 40 mg bid  Patient to receive 1 unit of PRBCs  Gi consulted  S/P EGD 5/16 showing mild erythematous mucosa in the antrum, one 25 mm polyp in the duodenal bulb; performed cold forceps biopsy with partial removal; tissue was ablated with argon plasma coagulation  Patient will need further evaluation by advanced endoscopist with EUS and possible resection for which Gi will arrange  Hg after EGD 6.9. Patient given 1 unit of PRBC for total of 2 units  Hg has remained stable at 8.4 x 2 today  Gi recommends continuing protonix 40 mg bid on discharge     Anemia  Hemoglobin noted to be 8.0 down from 16.5 1 year ago.  He is also reporting melanotic stools  Suspect in the setting of acute GI bleed vs KRUNAL  GI consulted   SIRS (systemic inflammatory response syndrome) (HCC)  Patient meeting SIRS criteria as evidenced by tachycardia and leukocytosis  He denies any infectious symptoms  CT abdomen pelvis without acute intra-abdominal pathology  Suspect noninfectious etiology in the setting of dehydration/GI bleed/anemia  Hold off on antibiotics  Trend CBC  monitor fever curve  Near syncope  Patient reports near syncopal episode after walking up a flight of stairs became lightheaded felt like he was going to pass out  He denies any prodromal symptoms such as chest pain, palpitations, shortness of breath.  Symptoms subsequently resolved after sitting down  Suspect in the setting of acute anemia/volume depletion  orthostatic vital signs were positive with increase in HR >10 from laying to sitting  Monitor on telemetry  Resolved with IV fluids and blood transfussion       Medical Problems      Discharging Physician / Practitioner: Dontrell Alexander DO  PCP: No primary care provider on file.  Admission Date:   Admission Orders (From admission, onward)       Ordered        05/15/25 2140  INPATIENT ADMISSION  Once                          Discharge Date: 05/17/25    Consultations During Hospital Stay:  Gi    Procedures Performed:   EGD IMPRESSION:  Irregular Z-line  Mild erythematous mucosa in the antrum  One 25 mm polyp in the duodenal bulb; performed cold forceps biopsy with partial removal; tissue was ablated with argon plasma coagulation  The esophagus, stomach, 1st part of the duodenum and 2nd part of the duodenum appeared normal.        RECOMMENDATION:  Await pathology results   Await pathology results   No ulcer disease and/or varices seen.  Very large polyp in duodenal bulb which was very friable erythematous and hemorrhagic, most likely the source of bleeding.  This will need further evaluation probably by evaluation of advanced endoscopist with the EUS and possible resection.  Patient's admission CBC indices suggestive of more of a chronic blood loss.  Now advance the diet as tolerated.  Keep on PPI.       Significant Findings / Test Results:   See above    Incidental Findings:   One 25 mm polyp in the duodenal bulb; performed cold forceps biopsy with partial removal; tissue was ablated with argon plasma coagulation   I reviewed the above mentioned incidental findings  "with the patient and/or family and they expressed understanding.    Test Results Pending at Discharge (will require follow up):   Biopsy results     Outpatient Tests Requested:  EUS in outpatient setting being arranged by Gi    Complications:  none    Reason for Admission: Melena    Hospital Course:   Indio Copeland is a 59 y.o. male patient who originally presented to the hospital on 5/15/2025 due to black stools that had been going on for 2 days prior to presentation.  Hemoglobin in the ED came back at 8. CT abdomen pelvis without acute intra-abdominal pathology.  Patient was started on IV Protonix, given 1 unit of PRBC and admitted to medicine for GI evaluation.  GI was consulted who performed EGD 5/16 showing mild erythematous mucosa in the antrum, one 25 mm polyp in the duodenal bulb; performed cold forceps biopsy with partial removal; tissue was ablated with argon plasma coagulation.  GI recommending outpatient follow-up with advanced endoscopist for EUS and possible resection.  Hemoglobin after EGD came back low at 6.9.  Patient was given a second unit of PRBC with hemoglobin improvement to 8.5 this morning.  Hemoglobin remained stable at 8.4 later in the morning and this afternoon.  GI cleared patient for discharge on Protonix 40 mg twice daily and outpatient follow-up for EUS.rge         Please see above list of diagnoses and related plan for additional information.     Condition at Discharge: stable    Discharge Day Visit / Exam:   Subjective:  Pt states he feels well, denies any further episodes of melena  Vitals: Blood Pressure: 117/75 (05/17/25 1458)  Pulse: 91 (05/17/25 1458)  Temperature: 98.1 °F (36.7 °C) (05/17/25 1458)  Temp Source: Oral (05/17/25 1458)  Respirations: 16 (05/17/25 1458)  Height: 5' 7\" (170.2 cm) (05/15/25 2234)  Weight - Scale: 84.5 kg (186 lb 4.6 oz) (05/15/25 2234)  SpO2: 100 % (05/17/25 1458)  Physical Exam  Vitals and nursing note reviewed.   Constitutional:       General: He " is not in acute distress.     Appearance: He is well-developed.   HENT:      Head: Normocephalic and atraumatic.     Eyes:      Conjunctiva/sclera: Conjunctivae normal.       Cardiovascular:      Rate and Rhythm: Normal rate and regular rhythm.      Heart sounds: No murmur heard.  Pulmonary:      Effort: Pulmonary effort is normal. No respiratory distress.      Breath sounds: Normal breath sounds.   Abdominal:      Palpations: Abdomen is soft.      Tenderness: There is no abdominal tenderness.     Musculoskeletal:         General: No swelling.     Skin:     General: Skin is warm and dry.     Neurological:      Mental Status: He is alert. Mental status is at baseline.     Psychiatric:         Mood and Affect: Mood normal.          Discussion with Family: Patient declined call to .     Discharge instructions/Information to patient and family:   See after visit summary for information provided to patient and family.      Provisions for Follow-Up Care:  See after visit summary for information related to follow-up care and any pertinent home health orders.      Mobility at time of Discharge:   Basic Mobility Inpatient Raw Score: 24  JH-HLM Goal: 8: Walk 250 feet or more  JH-HLM Achieved: 8: Walk 250 feet ot more  HLM Goal achieved. Continue to encourage appropriate mobility.     Disposition:   Home    Planned Readmission: no    Discharge Medications:  See after visit summary for reconciled discharge medications provided to patient and/or family.      Administrative Statements   Discharge Statement:  I have spent a total time of 35 minutes in caring for this patient on the day of the visit/encounter. >30 minutes of time was spent on: Diagnostic results, Prognosis, Risks and benefits of tx options, Instructions for management, Patient and family education, and Importance of tx compliance.    **Please Note: This note may have been constructed using a voice recognition system**

## 2025-05-17 NOTE — ASSESSMENT & PLAN NOTE
Patient reports near syncopal episode after walking up a flight of stairs became lightheaded felt like he was going to pass out  He denies any prodromal symptoms such as chest pain, palpitations, shortness of breath.  Symptoms subsequently resolved after sitting down  Suspect in the setting of acute anemia/volume depletion  orthostatic vital signs were positive with increase in HR >10 from laying to sitting  Monitor on telemetry  Resolved with IV fluids and blood transfussion

## 2025-05-17 NOTE — ASSESSMENT & PLAN NOTE
Hemoglobin noted to be 8.0 down from 16.5 1 year ago.  He is also reporting melanotic stools  Suspect in the setting of acute GI bleed vs KRUNAL  GI consulted

## 2025-05-19 ENCOUNTER — TELEPHONE (OUTPATIENT)
Age: 60
End: 2025-05-19

## 2025-05-19 NOTE — TELEPHONE ENCOUNTER
SPOKE WITH PT, REFERRED TO [facility] BY DR. HENDERSON FOR RECENT EGD 5/16/25, FINDING VERY LARGE POLYP IN DUODENAL BULB, FRIABLE AND HEMORRHAGIC. RECOMMENDING FURTHER EVALUATION BY ADVANCED ENDOSCOPIST WITH EUS AND POSSIBLE RESECTION. PT REQUESTING RETURN CALL  FOR APPOINTMENT/PROCEDURE. THANK YOU.

## 2025-05-19 NOTE — UTILIZATION REVIEW
NOTIFICATION OF ADMISSION DISCHARGE   This is a Notification of Discharge from [facility]. Please be advised that this patient has been discharge from our facility. Below you will find the admission and discharge date and time including the patient’s disposition.   UTILIZATION REVIEW CONTACT:  Utilization Review Assistants  Network Utilization Review Department  Phone: [phone] x carefully listen to the prompts. All voicemails are confidential.  Email: [email or web address]     ADMISSION INFORMATION  PRESENTATION DATE: 5/15/2025  7:32 PM  OBERVATION ADMISSION DATE: N/A  INPATIENT ADMISSION DATE: 5/15/25  9:41 PM   DISCHARGE DATE: 5/17/2025  3:11 PM   DISPOSITION:Home/Self Care    Network Utilization Review Department  ATTENTION: Please call with any questions or concerns to [phone] and carefully listen to the prompts so that you are directed to the right person. All voicemails are confidential.   For Discharge needs, contact Care Management DC Support Team at [phone] opt. 2  Send all requests for admission clinical reviews, approved or denied determinations and any other requests to dedicated fax number below belonging to the campus where the patient is receiving treatment. List of dedicated fax numbers for the Facilities:  FACILITY NAME UR FAX NUMBER   ADMISSION DENIALS (Administrative/Medical Necessity) [phone]   DISCHARGE SUPPORT TEAM ([facility]) [phone]   PARENT CHILD HEALTH (Maternity/NICU/Pediatrics) [phone]   [facility] [phone]   [facility] [phone]   [facility] [phone]   [facility] [phone]   [facility] [phone]   [facility] [phone]   [facility] [phone]   [facility] [phone]   [facility]  [facility] [phone]   [facility] [phone]   [facility] [phone]   [facility] [phone]

## 2025-05-20 NOTE — TELEPHONE ENCOUNTER
SPOKE WITH PT, INFORMED HIS CASE IS BEING REVIEWED BY [facility] ENDOSCOPY TEAM. PT APPRECIATIVE OF THE CALL.

## 2025-05-21 ENCOUNTER — TELEPHONE (OUTPATIENT)
Dept: GASTROENTEROLOGY | Facility: MEDICAL CENTER | Age: 60
End: 2025-05-21

## 2025-05-21 NOTE — TELEPHONE ENCOUNTER
Referring physician : Karyn    Diagnosis : Melena, anemia    Discussed with patient : YES/NO: yes    Symptoms : Patient hospitalized 5/15 to 5/17/2025 due to melena and anemia.    Labs : Hemoglobin 5/17 8.4    Imaging : CT A/P with IV contrast 5/15/2025: No acute inflammatory process identified, stable nonobstructing left-sided intrarenal calculi measuring up to 8 x 5 without hydronephrosis    Prior endoscopy : EGD 5/16/2025:rregular Z-line, mild erythematous mucosa in the antrum, one 25 mm polyp in the duodenal bulb with partial removal and APC (biopsies pending)    Anticoagulation/ Antiplatelet therapy :  none    Assessment :   1. Melena, anemia  2. 25mm friable erythematous and hemorrhagic polyp in duodenal bulb    Plan :   1. EGD with EMR    Attending review :   Reviewed by : Dylan Mosqueda  Comments :     Order placed : YES/NO: no    Informed scheduling staff : YES/NO: yes

## 2025-05-21 NOTE — TELEPHONE ENCOUNTER
----- Message from HUNTER Dang sent at 5/16/2025  4:14 PM EDT -----  Regarding: EUS  This patient was admitted to [facility] due to melena/anemia found with a very large polyp in duodenal bulb which was very friable erythematous and hemorrhagic and felt most likely the source of bleeding which was biopsied & treated with APC.  Dr. Reid is recommending evaluation of advanced endoscopist with the EUS and possible resection in the near future if this can please be coordinated. Thanks

## 2025-05-21 NOTE — TELEPHONE ENCOUNTER
Patients GI provider:  Dr. Reid    Number to return call: [phone]    Reason for call: Pt calling wondering when he will be called  to schedule next procedure / Rev'd chart explaining a message was sent  to our advance schedulers to schedule EGD with EMR and he will be receiving a call/ Patient when because things are bad and he faints just discharged  and very concerned / Patient having some anxiety and would like a call back stating from anyone letting him know something / I apologized explaining message will be sent and someone will be touching base with at least letting him know things are being worked on / Patient will await a cb    Scheduled procedure/appointment date if applicable: N/A

## 2025-05-21 NOTE — TELEPHONE ENCOUNTER
PT gave a callback and stated they forgot to ask which doctor would be calling back and doing the procedure. I did advise that this was routed to the Advance team, and the  would be able to discuss which provider they can be scheduled with once they receive a call for scheduling.     Skin normal color for race, warm, dry and intact. No evidence of rash.

## 2025-05-22 ENCOUNTER — PREP FOR PROCEDURE (OUTPATIENT)
Dept: GASTROENTEROLOGY | Facility: MEDICAL CENTER | Age: 60
End: 2025-05-22

## 2025-05-22 DIAGNOSIS — K31.7 POLYP OF DUODENUM: Primary | ICD-10-CM

## 2025-05-22 DIAGNOSIS — D50.0 IRON DEFICIENCY ANEMIA DUE TO CHRONIC BLOOD LOSS: ICD-10-CM

## 2025-05-22 PROCEDURE — 88305 TISSUE EXAM BY PATHOLOGIST: CPT | Performed by: SPECIALIST

## 2025-05-22 RX ORDER — SODIUM CHLORIDE, SODIUM LACTATE, POTASSIUM CHLORIDE, CALCIUM CHLORIDE 600; 310; 30; 20 MG/100ML; MG/100ML; MG/100ML; MG/100ML
125 INJECTION, SOLUTION INTRAVENOUS CONTINUOUS
OUTPATIENT
Start: 2025-05-22

## 2025-05-22 NOTE — TELEPHONE ENCOUNTER
Procedure: EUS   Scheduled date of procedure (as of today): 5/30/25  Physician performing procedure: Dr. Jones   Location of procedure: [facility]   Instructions reviewed with patient by:  Melvina doty & emailed to [email or web address]  Clearances:  n/a

## 2025-05-28 ENCOUNTER — OFFICE VISIT (OUTPATIENT)
Age: 60
End: 2025-05-28
Payer: COMMERCIAL

## 2025-05-28 VITALS
OXYGEN SATURATION: 98 % | HEART RATE: 80 BPM | DIASTOLIC BLOOD PRESSURE: 73 MMHG | WEIGHT: 184.6 LBS | BODY MASS INDEX: 29.67 KG/M2 | RESPIRATION RATE: 18 BRPM | SYSTOLIC BLOOD PRESSURE: 117 MMHG | TEMPERATURE: 98.2 F | HEIGHT: 66 IN

## 2025-05-28 DIAGNOSIS — E78.2 MIXED HYPERLIPIDEMIA: ICD-10-CM

## 2025-05-28 DIAGNOSIS — D62 ACUTE BLOOD LOSS ANEMIA: Primary | ICD-10-CM

## 2025-05-28 DIAGNOSIS — Z00.00 HEALTHCARE MAINTENANCE: ICD-10-CM

## 2025-05-28 DIAGNOSIS — Z12.5 SCREENING FOR PROSTATE CANCER: ICD-10-CM

## 2025-05-28 DIAGNOSIS — E55.9 VITAMIN D DEFICIENCY: ICD-10-CM

## 2025-05-28 PROBLEM — R65.10 SIRS (SYSTEMIC INFLAMMATORY RESPONSE SYNDROME) (HCC): Status: RESOLVED | Noted: 2025-05-15 | Resolved: 2025-05-28

## 2025-05-28 PROCEDURE — 99214 OFFICE O/P EST MOD 30 MIN: CPT

## 2025-05-28 RX ORDER — SODIUM CHLORIDE, SODIUM LACTATE, POTASSIUM CHLORIDE, CALCIUM CHLORIDE 600; 310; 30; 20 MG/100ML; MG/100ML; MG/100ML; MG/100ML
125 INJECTION, SOLUTION INTRAVENOUS CONTINUOUS
Status: CANCELLED | OUTPATIENT
Start: 2025-05-28

## 2025-05-28 NOTE — ASSESSMENT & PLAN NOTE
Repeat CBC with differential in couple months.  May take multiple vitamin with iron for a month.  Monitor for color of stool.  We will continue to monitor  May take multivitamins with iron for a month:[id number]    Orders:  •  CBC and differential; Future

## 2025-05-28 NOTE — PROGRESS NOTES
Name: Indio Copeland      : 1965      MRN: [id number]  Encounter Provider: Judith Rodrigues MD  Encounter Date: 2025   Encounter department: [facility]  :  Assessment & Plan  Acute blood loss anemia  Repeat CBC with differential in couple months.  May take multiple vitamin with iron for a month.  Monitor for color of stool.  We will continue to monitor  May take multivitamins with iron for a month:[id number]    Orders:  •  CBC and differential; Future    Healthcare maintenance    Orders:  •  Comprehensive metabolic panel; Future  •  Urinalysis with microscopic    Mixed hyperlipidemia    Orders:  •  Lipid Panel with Direct LDL reflex; Future    Vitamin D deficiency    Orders:  •  Vitamin D 25 hydroxy; Future    Screening for prostate cancer    Orders:  •  PSA, Total Screen; Future           History of Present Illness   Patient is first time seen by me patient was recently hospitalized at [facility] on May 15, 2025 and discharged on 2025 with dark stool and had a EGD done which detected large polyp in the duodenum which is scheduled to see a specialist and have it removed.  Patient also had acute blood loss anemia hemoglobin was around 6 last hemoglobin is more than 8.  Patient currently feeling well no more discolored stool denies any chest pain or shortness of breath.  No abdomen pain, nausea, vomiting, lightheadedness or dizziness.  No bowel or bladder issues.  No tingling or muscle weakness.  No headaches.  No fever or chills.  No other complaints.      Review of Systems   Constitutional:  Negative for chills, fatigue and fever.   HENT:  Negative for congestion, ear pain, rhinorrhea, sneezing and sore throat.    Eyes:  Negative for redness, itching and visual disturbance.   Respiratory:  Negative for cough, chest tightness and shortness of breath.    Cardiovascular:  Negative for chest pain, palpitations and leg swelling.   Gastrointestinal:   "Negative for abdominal pain, blood in stool, diarrhea, nausea and vomiting.   Endocrine: Negative for cold intolerance and heat intolerance.   Genitourinary:  Negative for dysuria, frequency and urgency.   Musculoskeletal:  Negative for arthralgias, back pain and myalgias.   Skin:  Negative for color change and rash.   Neurological:  Negative for dizziness, weakness, light-headedness, numbness and headaches.   Hematological:  Does not bruise/bleed easily.   Psychiatric/Behavioral:  Negative for agitation, behavioral problems and confusion.        Objective   /73 (BP Location: Right arm, Patient Position: Sitting, Cuff Size: Standard)   Pulse 80   Temp 98.2 °F (36.8 °C) (Temporal)   Resp 18   Ht 5' 5.75\" (1.67 m)   Wt 83.7 kg (184 lb 9.6 oz)   SpO2 98%   BMI 30.02 kg/m²      Physical Exam  Constitutional:       General: He is not in acute distress.     Appearance: Normal appearance. He is not ill-appearing, toxic-appearing or diaphoretic.   HENT:      Head: Normocephalic and atraumatic.      Right Ear: Tympanic membrane, ear canal and external ear normal. There is no impacted cerumen (Has wax but not impacted).      Left Ear: Tympanic membrane, ear canal and external ear normal. There is no impacted cerumen (Has wax but not impacted).      Nose: No congestion.      Mouth/Throat:      Pharynx: No oropharyngeal exudate or posterior oropharyngeal erythema.     Eyes:      General: No scleral icterus.        Right eye: No discharge.         Left eye: No discharge.      Extraocular Movements: Extraocular movements intact.      Conjunctiva/sclera: Conjunctivae normal.      Pupils: Pupils are equal, round, and reactive to light.       Cardiovascular:      Rate and Rhythm: Normal rate and regular rhythm.      Pulses: Normal pulses.      Heart sounds: Normal heart sounds. No murmur heard.     No gallop.   Pulmonary:      Effort: Pulmonary effort is normal. No respiratory distress.      Breath sounds: Normal breath " sounds. No wheezing or rales.   Abdominal:      General: There is no distension.      Tenderness: There is no abdominal tenderness. There is no right CVA tenderness, left CVA tenderness or guarding.     Musculoskeletal:         General: No swelling or tenderness. Normal range of motion.      Cervical back: Normal range of motion. No rigidity.      Right lower leg: No edema.      Left lower leg: No edema.   Lymphadenopathy:      Cervical: No cervical adenopathy.     Skin:     Coloration: Skin is not jaundiced or pale.     Neurological:      Mental Status: He is alert.      Sensory: No sensory deficit.      Motor: No weakness.     Psychiatric:         Mood and Affect: Mood normal.         Behavior: Behavior normal.

## 2025-05-29 ENCOUNTER — RESULTS FOLLOW-UP (OUTPATIENT)
Dept: GASTROENTEROLOGY | Facility: CLINIC | Age: 60
End: 2025-05-29

## 2025-05-30 ENCOUNTER — ANESTHESIA EVENT (OUTPATIENT)
Dept: GASTROENTEROLOGY | Facility: HOSPITAL | Age: 60
End: 2025-05-30
Payer: COMMERCIAL

## 2025-05-30 ENCOUNTER — ANESTHESIA (OUTPATIENT)
Dept: GASTROENTEROLOGY | Facility: HOSPITAL | Age: 60
End: 2025-05-30
Payer: COMMERCIAL

## 2025-05-30 ENCOUNTER — HOSPITAL ENCOUNTER (OUTPATIENT)
Dept: GASTROENTEROLOGY | Facility: HOSPITAL | Age: 60
Setting detail: OUTPATIENT SURGERY
Discharge: HOME | End: 2025-05-30
Payer: COMMERCIAL

## 2025-05-30 VITALS
SYSTOLIC BLOOD PRESSURE: 110 MMHG | OXYGEN SATURATION: 100 % | HEIGHT: 66 IN | HEART RATE: 55 BPM | RESPIRATION RATE: 17 BRPM | WEIGHT: 184 LBS | BODY MASS INDEX: 29.57 KG/M2 | DIASTOLIC BLOOD PRESSURE: 72 MMHG | TEMPERATURE: 96.9 F

## 2025-05-30 DIAGNOSIS — D50.0 IRON DEFICIENCY ANEMIA DUE TO CHRONIC BLOOD LOSS: ICD-10-CM

## 2025-05-30 DIAGNOSIS — K31.7 POLYP OF DUODENUM: ICD-10-CM

## 2025-05-30 PROCEDURE — 88305 TISSUE EXAM BY PATHOLOGIST: CPT | Performed by: PATHOLOGY

## 2025-05-30 PROCEDURE — 43259 EGD US EXAM DUODENUM/JEJUNUM: CPT | Performed by: INTERNAL MEDICINE

## 2025-05-30 PROCEDURE — 88342 IMHCHEM/IMCYTCHM 1ST ANTB: CPT | Performed by: PATHOLOGY

## 2025-05-30 PROCEDURE — 88360 TUMOR IMMUNOHISTOCHEM/MANUAL: CPT | Performed by: PATHOLOGY

## 2025-05-30 PROCEDURE — 88341 IMHCHEM/IMCYTCHM EA ADD ANTB: CPT | Performed by: PATHOLOGY

## 2025-05-30 PROCEDURE — 43239 EGD BIOPSY SINGLE/MULTIPLE: CPT | Performed by: INTERNAL MEDICINE

## 2025-05-30 RX ORDER — FENTANYL CITRATE 50 UG/ML
INJECTION, SOLUTION INTRAMUSCULAR; INTRAVENOUS AS NEEDED
Status: DISCONTINUED | OUTPATIENT
Start: 2025-05-30 | End: 2025-05-30

## 2025-05-30 RX ORDER — PROPOFOL 10 MG/ML
INJECTION, EMULSION INTRAVENOUS AS NEEDED
Status: DISCONTINUED | OUTPATIENT
Start: 2025-05-30 | End: 2025-05-30

## 2025-05-30 RX ORDER — SODIUM CHLORIDE, SODIUM LACTATE, POTASSIUM CHLORIDE, CALCIUM CHLORIDE 600; 310; 30; 20 MG/100ML; MG/100ML; MG/100ML; MG/100ML
125 INJECTION, SOLUTION INTRAVENOUS CONTINUOUS
Status: DISCONTINUED | OUTPATIENT
Start: 2025-05-30 | End: 2025-06-03 | Stop reason: HOSPADM

## 2025-05-30 RX ORDER — LIDOCAINE HYDROCHLORIDE 20 MG/ML
INJECTION, SOLUTION EPIDURAL; INFILTRATION; INTRACAUDAL; PERINEURAL AS NEEDED
Status: DISCONTINUED | OUTPATIENT
Start: 2025-05-30 | End: 2025-05-30

## 2025-05-30 RX ORDER — GLYCOPYRROLATE 0.2 MG/ML
INJECTION INTRAMUSCULAR; INTRAVENOUS AS NEEDED
Status: DISCONTINUED | OUTPATIENT
Start: 2025-05-30 | End: 2025-05-30

## 2025-05-30 RX ADMIN — SODIUM CHLORIDE, SODIUM LACTATE, POTASSIUM CHLORIDE, AND CALCIUM CHLORIDE 125 ML/HR: .6; .31; .03; .02 INJECTION, SOLUTION INTRAVENOUS at 08:53

## 2025-05-30 RX ADMIN — GLYCOPYRROLATE 0.1 MG: 0.2 INJECTION, SOLUTION INTRAMUSCULAR; INTRAVENOUS at 10:29

## 2025-05-30 RX ADMIN — PROPOFOL 50 MG: 10 INJECTION, EMULSION INTRAVENOUS at 10:32

## 2025-05-30 RX ADMIN — LIDOCAINE HYDROCHLORIDE 100 MG: 20 INJECTION, SOLUTION EPIDURAL; INFILTRATION; INTRACAUDAL at 10:29

## 2025-05-30 RX ADMIN — FENTANYL CITRATE 25 MCG: 50 INJECTION INTRAMUSCULAR; INTRAVENOUS at 10:31

## 2025-05-30 RX ADMIN — PROPOFOL 120 MCG/KG/MIN: 10 INJECTION, EMULSION INTRAVENOUS at 10:33

## 2025-05-30 RX ADMIN — PROPOFOL 150 MG: 10 INJECTION, EMULSION INTRAVENOUS at 10:29

## 2025-05-30 RX ADMIN — FENTANYL CITRATE 12.5 MCG: 50 INJECTION INTRAMUSCULAR; INTRAVENOUS at 10:35

## 2025-05-30 RX ADMIN — SODIUM CHLORIDE, SODIUM LACTATE, POTASSIUM CHLORIDE, AND CALCIUM CHLORIDE: .6; .31; .03; .02 INJECTION, SOLUTION INTRAVENOUS at 10:38

## 2025-05-30 NOTE — ANESTHESIA PREPROCEDURE EVALUATION
Procedure:  ENDOSCOPIC ULTRASOUND (UPPER)    Relevant Problems   HEMATOLOGY   (+) Acute blood loss anemia   (+) Anemia        Physical Exam    Airway     Mallampati score: II  TM Distance: >3 FB  Neck ROM: full  Upper bite lip test: I  Mouth opening: >= 4 cm      Cardiovascular  Rhythm: regular, Rate: normalCardiovascular exam normal    Dental   No notable dental hx     Pulmonary  Pulmonary exam normal Breath sounds clear to auscultation    Neurological  - normal exam  He appears awake, alert and oriented x3.      Other Findings        Anesthesia Plan  ASA Score- 2     Anesthesia Type- IV sedation with anesthesia with ASA Monitors.         Additional Monitors:     Airway Plan: natural airway.           Plan Factors-Exercise tolerance (METS): >4 METS.    Chart reviewed.   Existing labs reviewed. Patient summary reviewed.    Patient is not a current smoker.      Obstructive sleep apnea risk education given perioperatively.        Induction-     Postoperative Plan- .   Monitoring Plan - Monitoring plan - standard ASA monitoring  Post Operative Pain Plan - non-opiod analgesics and multimodal analgesia        Informed Consent- Anesthetic plan and risks discussed with patient.  I personally reviewed this patient with the CRNA. Discussed and agreed on the Anesthesia Plan with the CRNA..      NPO Status:  Vitals Value Taken Time   Date of last liquid 05/30/25 05/30/25 08:40   Time of last liquid 0600 05/30/25 08:40   Date of last solid 05/29/25 05/30/25 08:40   Time of last solid 1830 05/30/25 08:40

## 2025-06-02 ENCOUNTER — TELEPHONE (OUTPATIENT)
Age: 60
End: 2025-06-02

## 2025-06-02 NOTE — TELEPHONE ENCOUNTER
Patients GI provider:  Dr. Jones    Number to return call: [phone]    Reason for call: Pt calling to schedule EGD with Dr Jones. Please reach back out to schedule    Scheduled procedure/appointment date if applicable: Apt/procedure

## 2025-06-02 NOTE — TELEPHONE ENCOUNTER
Procedure: EGD  Date: 6/24/25  Physician performing: Dr. Jones  Location of procedure:  [facility]  Instructions given to patient: EGD  Diabetic: No  Clearances: N/A  Pt requested to have prep instructions sent via [facility] and mail

## 2025-06-04 PROCEDURE — 88341 IMHCHEM/IMCYTCHM EA ADD ANTB: CPT | Performed by: PATHOLOGY

## 2025-06-04 PROCEDURE — 88342 IMHCHEM/IMCYTCHM 1ST ANTB: CPT | Performed by: PATHOLOGY

## 2025-06-04 PROCEDURE — 88360 TUMOR IMMUNOHISTOCHEM/MANUAL: CPT | Performed by: PATHOLOGY

## 2025-06-04 PROCEDURE — 88305 TISSUE EXAM BY PATHOLOGIST: CPT | Performed by: PATHOLOGY

## 2025-06-22 RX ORDER — SODIUM CHLORIDE, SODIUM LACTATE, POTASSIUM CHLORIDE, CALCIUM CHLORIDE 600; 310; 30; 20 MG/100ML; MG/100ML; MG/100ML; MG/100ML
125 INJECTION, SOLUTION INTRAVENOUS CONTINUOUS
Status: CANCELLED | OUTPATIENT
Start: 2025-06-22

## 2025-06-22 RX ORDER — SODIUM CHLORIDE, SODIUM LACTATE, POTASSIUM CHLORIDE, CALCIUM CHLORIDE 600; 310; 30; 20 MG/100ML; MG/100ML; MG/100ML; MG/100ML
20 INJECTION, SOLUTION INTRAVENOUS CONTINUOUS
Status: CANCELLED | OUTPATIENT
Start: 2025-06-22

## 2025-06-24 ENCOUNTER — HOSPITAL ENCOUNTER (OUTPATIENT)
Dept: GASTROENTEROLOGY | Facility: HOSPITAL | Age: 60
Setting detail: OUTPATIENT SURGERY
Discharge: HOME | End: 2025-06-24
Attending: INTERNAL MEDICINE
Payer: COMMERCIAL

## 2025-06-24 ENCOUNTER — ANESTHESIA (OUTPATIENT)
Dept: GASTROENTEROLOGY | Facility: HOSPITAL | Age: 60
End: 2025-06-24
Payer: COMMERCIAL

## 2025-06-24 ENCOUNTER — ANESTHESIA EVENT (OUTPATIENT)
Dept: GASTROENTEROLOGY | Facility: HOSPITAL | Age: 60
End: 2025-06-24
Payer: COMMERCIAL

## 2025-06-24 VITALS
RESPIRATION RATE: 18 BRPM | HEART RATE: 59 BPM | TEMPERATURE: 97 F | DIASTOLIC BLOOD PRESSURE: 67 MMHG | SYSTOLIC BLOOD PRESSURE: 104 MMHG | OXYGEN SATURATION: 100 %

## 2025-06-24 DIAGNOSIS — K31.7 POLYP OF DUODENUM: ICD-10-CM

## 2025-06-24 PROBLEM — D13.2 ADENOMATOUS DUODENAL POLYP: Status: ACTIVE | Noted: 2025-06-24

## 2025-06-24 RX ORDER — LIDOCAINE HYDROCHLORIDE 10 MG/ML
INJECTION, SOLUTION EPIDURAL; INFILTRATION; INTRACAUDAL; PERINEURAL AS NEEDED
Status: DISCONTINUED | OUTPATIENT
Start: 2025-06-24 | End: 2025-06-24

## 2025-06-24 RX ORDER — SODIUM CHLORIDE, SODIUM LACTATE, POTASSIUM CHLORIDE, CALCIUM CHLORIDE 600; 310; 30; 20 MG/100ML; MG/100ML; MG/100ML; MG/100ML
20 INJECTION, SOLUTION INTRAVENOUS CONTINUOUS
Status: DISCONTINUED | OUTPATIENT
Start: 2025-06-24 | End: 2025-06-28 | Stop reason: HOSPADM

## 2025-06-24 RX ORDER — SODIUM CHLORIDE, SODIUM LACTATE, POTASSIUM CHLORIDE, CALCIUM CHLORIDE 600; 310; 30; 20 MG/100ML; MG/100ML; MG/100ML; MG/100ML
125 INJECTION, SOLUTION INTRAVENOUS CONTINUOUS
Status: DISCONTINUED | OUTPATIENT
Start: 2025-06-24 | End: 2025-06-24 | Stop reason: SDUPTHER

## 2025-06-24 RX ORDER — SODIUM CHLORIDE, SODIUM LACTATE, POTASSIUM CHLORIDE, CALCIUM CHLORIDE 600; 310; 30; 20 MG/100ML; MG/100ML; MG/100ML; MG/100ML
125 INJECTION, SOLUTION INTRAVENOUS CONTINUOUS
Status: DISCONTINUED | OUTPATIENT
Start: 2025-06-24 | End: 2025-06-28 | Stop reason: HOSPADM

## 2025-06-24 RX ORDER — PROPOFOL 10 MG/ML
INJECTION, EMULSION INTRAVENOUS AS NEEDED
Status: DISCONTINUED | OUTPATIENT
Start: 2025-06-24 | End: 2025-06-24

## 2025-06-24 RX ADMIN — PROPOFOL 50 MG: 10 INJECTION, EMULSION INTRAVENOUS at 13:26

## 2025-06-24 RX ADMIN — LIDOCAINE HYDROCHLORIDE 60 MG: 10 INJECTION, SOLUTION EPIDURAL; INFILTRATION; INTRACAUDAL; PERINEURAL at 12:57

## 2025-06-24 RX ADMIN — PROPOFOL 150 MG: 10 INJECTION, EMULSION INTRAVENOUS at 12:57

## 2025-06-24 RX ADMIN — PROPOFOL 50 MG: 10 INJECTION, EMULSION INTRAVENOUS at 13:14

## 2025-06-24 RX ADMIN — PROPOFOL 40 MG: 10 INJECTION, EMULSION INTRAVENOUS at 13:00

## 2025-06-24 RX ADMIN — PROPOFOL 50 MG: 10 INJECTION, EMULSION INTRAVENOUS at 13:19

## 2025-06-24 RX ADMIN — PROPOFOL 50 MG: 10 INJECTION, EMULSION INTRAVENOUS at 13:06

## 2025-06-24 RX ADMIN — SODIUM CHLORIDE, SODIUM LACTATE, POTASSIUM CHLORIDE, AND CALCIUM CHLORIDE 125 ML/HR: .6; .31; .03; .02 INJECTION, SOLUTION INTRAVENOUS at 12:12

## 2025-06-24 NOTE — ANESTHESIA POSTPROCEDURE EVALUATION
Post-Op Assessment Note    CV Status:  Stable    Pain management: adequate       Mental Status:  Awake   Hydration Status:  Stable   PONV Controlled:  Controlled   Airway Patency:  Patent     Post Op Vitals Reviewed: Yes    No anethesia notable event occurred.    Staff: Anesthesiologist           Last Filed PACU Vitals:  Vitals Value Taken Time   Temp 97 °F (36.1 °C) 06/24/25 13:39   Pulse 72 06/24/25 13:54   /64 06/24/25 13:54   Resp 18 06/24/25 13:54   SpO2 97 % 06/24/25 13:54       Modified Mikal:     Vitals Value Taken Time   Activity 2 06/24/25 13:54   Respiration 2 06/24/25 13:54   Circulation 2 06/24/25 13:54   Consciousness 1 06/24/25 13:54   Oxygen Saturation 2 06/24/25 13:54     Modified Mikal Score: 9

## 2025-06-24 NOTE — H&P
History and Physical -  [facility]  Indio Copeland 59 y.o. male MRN: [id number]                  HPI: Indio Copeland is a 59 y.o. year old male who presents for EGD for surveillance of large duodenal inflammatory polyp.      REVIEW OF SYSTEMS: Per the HPI, and otherwise unremarkable.    Historical Information   Past Medical History[1]  Past Surgical History[2]  Social History   Social History     Substance and Sexual Activity   Alcohol Use Not Currently    Comment: glass of wine approx. once a month     Social History     Substance and Sexual Activity   Drug Use Never     Tobacco Use History[3]  Family History[4]    Meds/Allergies     Current Medications[5]    Allergies[6]    Objective     Vitals reviewed prior to procedure.    PHYSICAL EXAM    Gen: NAD  Head: NCAT  CV: RRR  CHEST: Clear  ABD: soft, NT/ND  EXT: no edema      ASSESSMENT/PLAN:  This is a 59 y.o. year old male here for EGD, and he is stable and optimized for his procedure. We discussed potential adverse events related with the procedure. They verbalized their understanding and are in agreement with proceeding with the procedure.            [1]   Past Medical History:  Diagnosis Date    Anemia 5-    Clotting disorder (HCC) 5-    Colon polyp     History of transfusion     Kidney stone 5-    Shingles 2018   [2]   Past Surgical History:  Procedure Laterality Date    COLONOSCOPY      EGD     [3]   Social History  Tobacco Use   Smoking Status Never   Smokeless Tobacco Never   [4] No family history on file.  [5]   Current Outpatient Medications:     pantoprazole (PROTONIX) 40 mg tablet    Current Facility-Administered Medications:     lactated ringers infusion, 125 mL/hr, Intravenous, Continuous    lactated ringers infusion, 125 mL/hr, Intravenous, Continuous    lactated ringers infusion, 20 mL/hr, Intravenous, Continuous  [6] No Known Allergies

## 2025-06-24 NOTE — ANESTHESIA PREPROCEDURE EVALUATION
Procedure:  EGD    Relevant Problems   HEMATOLOGY   (+) Acute blood loss anemia   (+) Anemia      FEN/Gastrointestinal   (+) Adenomatous duodenal polyp (For removal today)      Other   (+) History of transfusion (June admission for GI bleed)        Physical Exam    Airway     Mallampati score: II  TM Distance: >3 FB    Mouth opening: >= 4 cm      Cardiovascular  Rhythm: regular, Rate: normal    Dental        Pulmonary   Breath sounds clear to auscultation    Neurological    He appears awake, alert and oriented x3.      Other Findings        Anesthesia Plan  ASA Score- 2     Anesthesia Type- IV sedation with anesthesia with ASA Monitors.         Additional Monitors:     Airway Plan: natural airway.    Comment: GA PRN.       Plan Factors-Exercise tolerance (METS): >4 METS.    Chart reviewed.  Imaging results reviewed.      Patient is not a current smoker.      Obstructive sleep apnea risk education given perioperatively.        Induction- intravenous.    Postoperative Plan- .   Monitoring Plan - Monitoring plan - standard ASA monitoring  Post Operative Pain Plan - non-opiod analgesics        Informed Consent- Anesthetic plan and risks discussed with patient.        NPO Status:  No vitals data found for the desired time range.

## 2025-08-04 ENCOUNTER — HOSPITAL ENCOUNTER (INPATIENT)
Facility: HOSPITAL | Age: 60
LOS: 3 days | Discharge: HOME | End: 2025-08-07
Attending: INTERNAL MEDICINE | Admitting: INTERNAL MEDICINE
Payer: COMMERCIAL

## 2025-08-04 ENCOUNTER — HOSPITAL ENCOUNTER (EMERGENCY)
Facility: HOSPITAL | Age: 60
End: 2025-08-04
Attending: EMERGENCY MEDICINE | Admitting: EMERGENCY MEDICINE
Payer: COMMERCIAL

## 2025-08-04 ENCOUNTER — APPOINTMENT (EMERGENCY)
Dept: CT IMAGING | Facility: HOSPITAL | Age: 60
End: 2025-08-04
Payer: COMMERCIAL

## 2025-08-04 ENCOUNTER — TELEPHONE (OUTPATIENT)
Age: 60
End: 2025-08-04

## 2025-08-04 VITALS
BODY MASS INDEX: 28.98 KG/M2 | HEART RATE: 78 BPM | OXYGEN SATURATION: 100 % | WEIGHT: 180.34 LBS | RESPIRATION RATE: 14 BRPM | DIASTOLIC BLOOD PRESSURE: 75 MMHG | SYSTOLIC BLOOD PRESSURE: 114 MMHG | TEMPERATURE: 98.6 F | HEIGHT: 66 IN

## 2025-08-04 DIAGNOSIS — K92.2 GI BLEED: ICD-10-CM

## 2025-08-04 DIAGNOSIS — D64.9 ANEMIA: Primary | ICD-10-CM

## 2025-08-04 LAB
ABO GROUP BLD: NORMAL
ALBUMIN SERPL BCG-MCNC: 3.8 G/DL (ref 3.5–5)
ALP SERPL-CCNC: 36 U/L (ref 34–104)
ALT SERPL W P-5'-P-CCNC: 28 U/L (ref 7–52)
ANION GAP SERPL CALCULATED.3IONS-SCNC: 7 MMOL/L (ref 4–13)
APTT PPP: 24 SECONDS (ref 23–34)
AST SERPL W P-5'-P-CCNC: 20 U/L (ref 13–39)
ATRIAL RATE: 81 BPM
BASOPHILS # BLD AUTO: 0.04 THOUSANDS/ÂΜL (ref 0–0.1)
BASOPHILS NFR BLD AUTO: 1 % (ref 0–1)
BILIRUB SERPL-MCNC: 0.29 MG/DL (ref 0.2–1)
BILIRUB UR QL STRIP: NEGATIVE
BLD GP AB SCN SERPL QL: NEGATIVE
BUN SERPL-MCNC: 16 MG/DL (ref 5–25)
CALCIUM SERPL-MCNC: 8.2 MG/DL (ref 8.4–10.2)
CARDIAC TROPONIN I PNL SERPL HS: <2 NG/L (ref ?–50)
CHLORIDE SERPL-SCNC: 108 MMOL/L (ref 96–108)
CLARITY UR: CLEAR
CO2 SERPL-SCNC: 24 MMOL/L (ref 21–32)
COLOR UR: YELLOW
CREAT SERPL-MCNC: 1.07 MG/DL (ref 0.6–1.3)
D DIMER PPP FEU-MCNC: 0.87 UG/ML FEU
EOSINOPHIL # BLD AUTO: 0.05 THOUSAND/ÂΜL (ref 0–0.61)
EOSINOPHIL NFR BLD AUTO: 1 % (ref 0–6)
ERYTHROCYTE [DISTWIDTH] IN BLOOD BY AUTOMATED COUNT: 17.5 % (ref 11.6–15.1)
GFR SERPL CREATININE-BSD FRML MDRD: 74 ML/MIN/1.73SQ M
GLUCOSE SERPL-MCNC: 105 MG/DL (ref 65–140)
GLUCOSE UR STRIP-MCNC: NEGATIVE MG/DL
HCT VFR BLD AUTO: 17.9 % (ref 36.5–49.3)
HGB BLD-MCNC: 4.9 G/DL (ref 12–17)
HGB UR QL STRIP.AUTO: NEGATIVE
IMM GRANULOCYTES # BLD AUTO: 0.02 THOUSAND/UL (ref 0–0.2)
IMM GRANULOCYTES NFR BLD AUTO: 0 % (ref 0–2)
INR PPP: 1.06 (ref 0.85–1.19)
KETONES UR STRIP-MCNC: NEGATIVE MG/DL
LEUKOCYTE ESTERASE UR QL STRIP: NEGATIVE
LYMPHOCYTES # BLD AUTO: 0.82 THOUSANDS/ÂΜL (ref 0.6–4.47)
LYMPHOCYTES NFR BLD AUTO: 18 % (ref 14–44)
MAGNESIUM SERPL-MCNC: 2.3 MG/DL (ref 1.9–2.7)
MCH RBC QN AUTO: 24.1 PG (ref 26.8–34.3)
MCHC RBC AUTO-ENTMCNC: 27.4 G/DL (ref 31.4–37.4)
MCV RBC AUTO: 88 FL (ref 82–98)
MONOCYTES # BLD AUTO: 0.46 THOUSAND/ÂΜL (ref 0.17–1.22)
MONOCYTES NFR BLD AUTO: 10 % (ref 4–12)
NEUTROPHILS # BLD AUTO: 3.29 THOUSANDS/ÂΜL (ref 1.85–7.62)
NEUTS SEG NFR BLD AUTO: 70 % (ref 43–75)
NITRITE UR QL STRIP: NEGATIVE
NRBC BLD AUTO-RTO: 0 /100 WBCS
P AXIS: 36 DEGREES
PH UR STRIP.AUTO: 7 [PH]
PLATELET # BLD AUTO: 287 THOUSANDS/UL (ref 149–390)
PMV BLD AUTO: 8.8 FL (ref 8.9–12.7)
POTASSIUM SERPL-SCNC: 4 MMOL/L (ref 3.5–5.3)
PR INTERVAL: 148 MS
PROT SERPL-MCNC: 5.7 G/DL (ref 6.4–8.4)
PROT UR STRIP-MCNC: NEGATIVE MG/DL
PROTHROMBIN TIME: 14.2 SECONDS (ref 12.3–15)
QRS AXIS: 37 DEGREES
QRSD INTERVAL: 78 MS
QT INTERVAL: 366 MS
QTC INTERVAL: 425 MS
RBC # BLD AUTO: 2.03 MILLION/UL (ref 3.88–5.62)
RH BLD: NEGATIVE
SODIUM SERPL-SCNC: 139 MMOL/L (ref 135–147)
SP GR UR STRIP.AUTO: 1.01 (ref 1–1.03)
SPECIMEN EXPIRATION DATE: NORMAL
T WAVE AXIS: 20 DEGREES
UROBILINOGEN UR QL STRIP.AUTO: 0.2 E.U./DL
VENTRICULAR RATE: 81 BPM
WBC # BLD AUTO: 4.68 THOUSAND/UL (ref 4.31–10.16)

## 2025-08-04 PROCEDURE — 99285 EMERGENCY DEPT VISIT HI MDM: CPT

## 2025-08-04 PROCEDURE — 83735 ASSAY OF MAGNESIUM: CPT | Performed by: EMERGENCY MEDICINE

## 2025-08-04 PROCEDURE — 85610 PROTHROMBIN TIME: CPT | Performed by: EMERGENCY MEDICINE

## 2025-08-04 PROCEDURE — 74178 CT ABD&PLV WO CNTR FLWD CNTR: CPT

## 2025-08-04 PROCEDURE — 85730 THROMBOPLASTIN TIME PARTIAL: CPT | Performed by: EMERGENCY MEDICINE

## 2025-08-04 PROCEDURE — 86850 RBC ANTIBODY SCREEN: CPT | Performed by: EMERGENCY MEDICINE

## 2025-08-04 PROCEDURE — 84484 ASSAY OF TROPONIN QUANT: CPT | Performed by: EMERGENCY MEDICINE

## 2025-08-04 PROCEDURE — 80053 COMPREHEN METABOLIC PANEL: CPT | Performed by: EMERGENCY MEDICINE

## 2025-08-04 PROCEDURE — 85379 FIBRIN DEGRADATION QUANT: CPT | Performed by: EMERGENCY MEDICINE

## 2025-08-04 PROCEDURE — P9016 RBC LEUKOCYTES REDUCED: HCPCS

## 2025-08-04 PROCEDURE — 85025 COMPLETE CBC W/AUTO DIFF WBC: CPT | Performed by: EMERGENCY MEDICINE

## 2025-08-04 PROCEDURE — 36430 TRANSFUSION BLD/BLD COMPNT: CPT

## 2025-08-04 PROCEDURE — 86923 COMPATIBILITY TEST ELECTRIC: CPT

## 2025-08-04 PROCEDURE — 93005 ELECTROCARDIOGRAM TRACING: CPT

## 2025-08-04 PROCEDURE — 93010 ELECTROCARDIOGRAM REPORT: CPT | Performed by: INTERNAL MEDICINE

## 2025-08-04 PROCEDURE — 81003 URINALYSIS AUTO W/O SCOPE: CPT | Performed by: EMERGENCY MEDICINE

## 2025-08-04 PROCEDURE — 36415 COLL VENOUS BLD VENIPUNCTURE: CPT | Performed by: EMERGENCY MEDICINE

## 2025-08-04 PROCEDURE — 86901 BLOOD TYPING SEROLOGIC RH(D): CPT | Performed by: EMERGENCY MEDICINE

## 2025-08-04 PROCEDURE — 86900 BLOOD TYPING SEROLOGIC ABO: CPT | Performed by: EMERGENCY MEDICINE

## 2025-08-04 PROCEDURE — 99291 CRITICAL CARE FIRST HOUR: CPT | Performed by: EMERGENCY MEDICINE

## 2025-08-04 RX ADMIN — IOHEXOL 100 ML: 350 INJECTION, SOLUTION INTRAVENOUS at 12:52

## 2025-08-05 ENCOUNTER — APPOINTMENT (INPATIENT)
Dept: PERIOP | Facility: HOSPITAL | Age: 60
End: 2025-08-05
Attending: NURSE PRACTITIONER
Payer: COMMERCIAL

## 2025-08-05 ENCOUNTER — ANESTHESIA EVENT (INPATIENT)
Dept: PERIOP | Facility: HOSPITAL | Age: 60
End: 2025-08-05
Payer: COMMERCIAL

## 2025-08-05 ENCOUNTER — ANESTHESIA (INPATIENT)
Dept: PERIOP | Facility: HOSPITAL | Age: 60
End: 2025-08-05
Payer: COMMERCIAL

## 2025-08-05 LAB
ABO GROUP BLD BPU: NORMAL
ABO GROUP BLD BPU: NORMAL
BPU ID: NORMAL
BPU ID: NORMAL
CROSSMATCH: NORMAL
CROSSMATCH: NORMAL
UNIT DISPENSE STATUS: NORMAL
UNIT DISPENSE STATUS: NORMAL
UNIT PRODUCT CODE: NORMAL
UNIT PRODUCT CODE: NORMAL
UNIT PRODUCT VOLUME: 300 ML
UNIT PRODUCT VOLUME: 350 ML
UNIT RH: NORMAL
UNIT RH: NORMAL

## 2025-08-05 RX ORDER — SODIUM CHLORIDE, SODIUM LACTATE, POTASSIUM CHLORIDE, CALCIUM CHLORIDE 600; 310; 30; 20 MG/100ML; MG/100ML; MG/100ML; MG/100ML
INJECTION, SOLUTION INTRAVENOUS CONTINUOUS PRN
Status: DISCONTINUED | OUTPATIENT
Start: 2025-08-05 | End: 2025-08-05

## 2025-08-05 RX ORDER — PHENYLEPHRINE HCL IN 0.9% NACL 1 MG/10 ML
SYRINGE (ML) INTRAVENOUS AS NEEDED
Status: DISCONTINUED | OUTPATIENT
Start: 2025-08-05 | End: 2025-08-05

## 2025-08-05 RX ORDER — LIDOCAINE HYDROCHLORIDE 10 MG/ML
INJECTION, SOLUTION EPIDURAL; INFILTRATION; INTRACAUDAL; PERINEURAL AS NEEDED
Status: DISCONTINUED | OUTPATIENT
Start: 2025-08-05 | End: 2025-08-05

## 2025-08-05 RX ORDER — PROPOFOL 10 MG/ML
INJECTION, EMULSION INTRAVENOUS AS NEEDED
Status: DISCONTINUED | OUTPATIENT
Start: 2025-08-05 | End: 2025-08-05

## 2025-08-05 RX ADMIN — PROPOFOL 50 MG: 10 INJECTION, EMULSION INTRAVENOUS at 14:46

## 2025-08-05 RX ADMIN — Medication 200 MCG: at 14:54

## 2025-08-05 RX ADMIN — PROPOFOL 50 MG: 10 INJECTION, EMULSION INTRAVENOUS at 14:33

## 2025-08-05 RX ADMIN — PROPOFOL 50 MG: 10 INJECTION, EMULSION INTRAVENOUS at 14:41

## 2025-08-05 RX ADMIN — PROPOFOL 150 MG: 10 INJECTION, EMULSION INTRAVENOUS at 14:29

## 2025-08-05 RX ADMIN — PROPOFOL 50 MG: 10 INJECTION, EMULSION INTRAVENOUS at 14:37

## 2025-08-05 RX ADMIN — LIDOCAINE HYDROCHLORIDE 50 MG: 10 INJECTION, SOLUTION EPIDURAL; INFILTRATION; INTRACAUDAL; PERINEURAL at 14:29

## 2025-08-05 RX ADMIN — Medication 200 MCG: at 14:39

## 2025-08-05 RX ADMIN — SODIUM CHLORIDE, SODIUM LACTATE, POTASSIUM CHLORIDE, AND CALCIUM CHLORIDE: .6; .31; .03; .02 INJECTION, SOLUTION INTRAVENOUS at 14:09

## 2025-08-07 ENCOUNTER — TELEPHONE (OUTPATIENT)
Age: 60
End: 2025-08-07

## 2025-08-07 ENCOUNTER — PREP FOR PROCEDURE (OUTPATIENT)
Dept: GASTROENTEROLOGY | Facility: CLINIC | Age: 60
End: 2025-08-07

## 2025-08-07 DIAGNOSIS — K31.7 POLYP OF DUODENUM: Primary | ICD-10-CM

## 2025-08-07 RX ORDER — SODIUM CHLORIDE, SODIUM LACTATE, POTASSIUM CHLORIDE, CALCIUM CHLORIDE 600; 310; 30; 20 MG/100ML; MG/100ML; MG/100ML; MG/100ML
125 INJECTION, SOLUTION INTRAVENOUS CONTINUOUS
OUTPATIENT
Start: 2025-08-07

## 2025-08-08 ENCOUNTER — PATIENT OUTREACH (OUTPATIENT)
Dept: CASE MANAGEMENT | Facility: OTHER | Age: 60
End: 2025-08-08

## 2025-08-08 ENCOUNTER — TELEPHONE (OUTPATIENT)
Age: 60
End: 2025-08-08

## 2025-08-08 ENCOUNTER — TRANSITIONAL CARE MANAGEMENT (OUTPATIENT)
Age: 60
End: 2025-08-08

## 2025-08-12 ENCOUNTER — OFFICE VISIT (OUTPATIENT)
Age: 60
End: 2025-08-12
Payer: COMMERCIAL

## 2025-08-12 ENCOUNTER — TELEPHONE (OUTPATIENT)
Age: 60
End: 2025-08-12

## 2025-08-20 ENCOUNTER — TELEPHONE (OUTPATIENT)
Dept: GASTROENTEROLOGY | Facility: CLINIC | Age: 60
End: 2025-08-20

## 2025-08-21 ENCOUNTER — OFFICE VISIT (OUTPATIENT)
Dept: GASTROENTEROLOGY | Facility: MEDICAL CENTER | Age: 60
End: 2025-08-21
Attending: INTERNAL MEDICINE

## 2025-08-21 VITALS
HEIGHT: 66 IN | SYSTOLIC BLOOD PRESSURE: 131 MMHG | BODY MASS INDEX: 29.12 KG/M2 | OXYGEN SATURATION: 99 % | TEMPERATURE: 97.9 F | HEART RATE: 87 BPM | WEIGHT: 181.2 LBS | DIASTOLIC BLOOD PRESSURE: 80 MMHG

## 2025-08-21 DIAGNOSIS — D62 ACUTE BLOOD LOSS ANEMIA: ICD-10-CM

## 2025-08-21 DIAGNOSIS — Z86.0100 PERSONAL HISTORY OF COLON POLYPS, UNSPECIFIED: ICD-10-CM

## 2025-08-21 DIAGNOSIS — D13.2 ADENOMATOUS DUODENAL POLYP: Primary | ICD-10-CM
